# Patient Record
(demographics unavailable — no encounter records)

---

## 2024-10-09 NOTE — ASSESSMENT
[FreeTextEntry1] : 53 y/o F with newly diagnosed LEFT breast [id number], ER/ID pos, Her 2 pos, stage 1 breast cancer, planning to undergo bilateral skin-sparing mastectomy with SLNBx, possible ALND  s/p BL free msTRAP flap breast reconstruction, doing well with excellent result Left breast hematoma aspirated 4/9: 15cc, 4/16: negative completed left breast XRT 11/2021  BL reconstructed breast asymmetry s/p massive weight loss indicated for revision.  s/p BL reconstructed breast revision with direct excision. Doing well.   Now POD#7 s/p right breast reconstruction revision with direct excision (lift). Doing very well and happy with results.   - suture tails removed, steri strips applied  - may shower, no submerging - pathology reviewed, benign - Scarguard to left breast scar  - post-op instructions discussed and all her questions were answered  - f/u 1 month with Dr. Morataya

## 2024-10-09 NOTE — PHYSICAL EXAM
[de-identified] : well-appearing, NAD [de-identified] : good inspiratory effort [de-identified] : no gross truncal asymmetry [de-identified] : Bilateral PAULO flaps warm soft with good cap refill, mastectomy flaps well perfused; right breast incision healing well, c/d/i, significantly improved breast symmetry, no palpable masses. No axillary masses  [de-identified] : incision healed, mature scar, no keloids

## 2024-10-09 NOTE — DATA REVIEWED
[FreeTextEntry1] : [facility] Accession Number : [id number] Patient:     KYAW BRANDON   Accession:                             [id number]  Collected Date/Time:                   10/2/2024 10:20 EDT Received Date/Time:                    10/2/2024 11:20 EDT  Surgical Pathology Report - Auth (Verified)  Specimen(s) Submitted Right breast mastectomy flap  Final Diagnosis Breast, right tissue and skin, PAULO flap reconstruction: - Benign fibrofatty breast tissue, skin, and subcutaneous adipose tissue with focal remote postsurgical site changes including suture granulomata.  Verified by: Hari James M.D. (Electronic Signature) Reported on: 10/04/24 14:09 EDT, [place] [address] Phone: [phone]   Fax: [phone]

## 2024-10-09 NOTE — ASSESSMENT
[FreeTextEntry1] : 53 y/o F with newly diagnosed LEFT breast [id number], ER/DC pos, Her 2 pos, stage 1 breast cancer, planning to undergo bilateral skin-sparing mastectomy with SLNBx, possible ALND  s/p BL free msTRAP flap breast reconstruction, doing well with excellent result Left breast hematoma aspirated 4/9: 15cc, 4/16: negative completed left breast XRT 11/2021  BL reconstructed breast asymmetry s/p massive weight loss indicated for revision.  s/p BL reconstructed breast revision with direct excision. Doing well.   Now POD#7 s/p right breast reconstruction revision with direct excision (lift). Doing very well and happy with results.   - suture tails removed, steri strips applied  - may shower, no submerging - pathology reviewed, benign - Scarguard to left breast scar  - post-op instructions discussed and all her questions were answered  - f/u 1 month with Dr. Morataya

## 2024-10-09 NOTE — HISTORY OF PRESENT ILLNESS
[FreeTextEntry1] : Pt is a 49 y/o F, , postmenopausal, with PMH of SAHRA on CPAP, obesity s/p gastric sleeve  (lost 80 lbs), migraines (gets botox q12 weeks), asthma (no hospitalizations since childhood), gastritis, HLD, and anxiety who presents for breast reconstruction consult. Pt was recently diagnosed with LEFT breast IDC, [id number], ER/WA pos, Her 2 pos, stage 1 breast cancer. Pt self-palpated a lump in 2020. Denies nipple discharge or inversion.  Per consultation with Dr. Correa she is a great candidate for either breast conservation surgery + XRT or mastectomy, both would require SLNBx. Patient is leaning towards bilateral mastectomy. Pt states she does not want implants.  H/o right breast cyst aspiration. No prior breast biopsies or surgeries  Family h/o Breast CA: two maternal aunts dx at age 46 and 47   Ht 5'6" Wt 200lb; weight stable Current bra size: 38D-DD Pt would like to be "as small as possible" --B cup or small C cup. C/o back and neck pain as well as shoulder grooving Nonsmoker Denies h/o VTE or MRSA infections Occ:   Interval hx (21): Pt presents today for follow up. Had CTA chest and abd/pel for preoperative planning as recommended. Still interested in free flap reconstruction and again endorses that she does not want implants.  No new health issues.  Interval hx (21):  POD#9 s/p BL msTRAM free flap.  Ambulating.  Compliant with abd binder and surgical bra.  Denies fevers, chills, swelling, calf pain, SOB, CP.   Only requiring Tylenol and gabapentin and celebrex.  Has not require xanax since hospitalization.  RADHA drains LSL 20/10/minimal; LIMF trace/trace/trace); RSL 10/15/5; RIMF 15/10/15; LAbd 60/30/50/20; RAbd 70/30/50/20  Interval hx (21): POD #16 s/p BL msTRAM free flap. Feeling well. Ambulating and doing PT/OT. Taking only Tylenol. Denies f/c, CP/SOB, abdominal pain, or calf pain. Drain output: BL breasts <5cc each daily, right abdomen 10-15cc/day.  Interval hx (21): POD #23 s/p BL msTRAM free flap. Feeling well. C/o mild heavy sensation to BL breasts,L>R, but otherwise feeling well and happy with results.  Interval hx (5/10/21):  6 weeks s/p BL msTRAM free flap.  Feeling well and happy with results. Recently had portacath placed; due to start CTX 21.  Interval hx (21)  3 months s/p BL msTRAM free flap.  pt is very happy with results.  pt has 4 cycles of chemo left; unknown at this time if XRT needed.  Pt would like to have smaller breast volume if possible at future revision.  Interval hx (21):  9 months s/p BL msTRAM free flap. Completed chemotherapy 2021. Completed radiation 2021. On letrozole and Herceptin + Perjeta. Taking gabapentin and Vitamin B complex for neuropathy. Still interested in being smaller.  Interval hx (22):  here for annual follow s/p  BL msTRAM free flap. Completed chemotherapy 2021. Completed radiation 2021.  s/p ADAM/BSO.  DC from [facility] therapy.  Performed compression and wearing sleeve.  Actively losing weight.  Pt still desires BL breast recon revision with debulking; she feels the size is too large.  Interval hx (12/15/23)  Here for annual check.  s/p BL msTRAM free flap. Completed chemotherapy 2021. Completed left breast radiation 2021.  She has since lost weight intentionally (-80 lbs).  Now 142 lbs after Ozempic; she is now being weaned off.  Pt still feels her BL breast are too large.  Wearing bralettes.  Here to discuss breast debulking options.  Interval hx (24). Pt here POD#7 s/p revision BL breast reconstruction. Doing well with no significant pain, f/c or bleeding. Took all prescribed medications.   Interval hx (24). Pt here 5 weeks s/p revision BL breast reconstruction. She is happy with results. Endorses right breast has dropped more than left breast. Left breast is softer than before surgery.  Interval hx (24):  11.5 weeks s/p revision BL breast reconstruction.  She is here for confirmation if she would like to proceed with additional revisional procedures.   Interval hx (10/9/24). Pt presents today POD#7 s/p revision of right breast reconstruction. Doing very well and happy with results. Denies any pain, f/c or bleeding.

## 2024-10-09 NOTE — PHYSICAL EXAM
[de-identified] : well-appearing, NAD [de-identified] : good inspiratory effort [de-identified] : no gross truncal asymmetry [de-identified] : Bilateral PAULO flaps warm soft with good cap refill, mastectomy flaps well perfused; right breast incision healing well, c/d/i, significantly improved breast symmetry, no palpable masses. No axillary masses  [de-identified] : incision healed, mature scar, no keloids

## 2024-10-09 NOTE — HISTORY OF PRESENT ILLNESS
[FreeTextEntry1] : Pt is a 49 y/o F, , postmenopausal, with PMH of SAHRA on CPAP, obesity s/p gastric sleeve  (lost 80 lbs), migraines (gets botox q12 weeks), asthma (no hospitalizations since childhood), gastritis, HLD, and anxiety who presents for breast reconstruction consult. Pt was recently diagnosed with LEFT breast IDC, [id number], ER/NC pos, Her 2 pos, stage 1 breast cancer. Pt self-palpated a lump in 2020. Denies nipple discharge or inversion.  Per consultation with Dr. Correa she is a great candidate for either breast conservation surgery + XRT or mastectomy, both would require SLNBx. Patient is leaning towards bilateral mastectomy. Pt states she does not want implants.  H/o right breast cyst aspiration. No prior breast biopsies or surgeries  Family h/o Breast CA: two maternal aunts dx at age 46 and 47   Ht 5'6" Wt 200lb; weight stable Current bra size: 38D-DD Pt would like to be "as small as possible" --B cup or small C cup. C/o back and neck pain as well as shoulder grooving Nonsmoker Denies h/o VTE or MRSA infections Occ:   Interval hx (21): Pt presents today for follow up. Had CTA chest and abd/pel for preoperative planning as recommended. Still interested in free flap reconstruction and again endorses that she does not want implants.  No new health issues.  Interval hx (21):  POD#9 s/p BL msTRAM free flap.  Ambulating.  Compliant with abd binder and surgical bra.  Denies fevers, chills, swelling, calf pain, SOB, CP.   Only requiring Tylenol and gabapentin and celebrex.  Has not require xanax since hospitalization.  RADHA drains LSL 20/10/minimal; LIMF trace/trace/trace); RSL 10/15/5; RIMF 15/10/15; LAbd 60/30/50/20; RAbd 70/30/50/20  Interval hx (21): POD #16 s/p BL msTRAM free flap. Feeling well. Ambulating and doing PT/OT. Taking only Tylenol. Denies f/c, CP/SOB, abdominal pain, or calf pain. Drain output: BL breasts <5cc each daily, right abdomen 10-15cc/day.  Interval hx (21): POD #23 s/p BL msTRAM free flap. Feeling well. C/o mild heavy sensation to BL breasts,L>R, but otherwise feeling well and happy with results.  Interval hx (5/10/21):  6 weeks s/p BL msTRAM free flap.  Feeling well and happy with results. Recently had portacath placed; due to start CTX 21.  Interval hx (21)  3 months s/p BL msTRAM free flap.  pt is very happy with results.  pt has 4 cycles of chemo left; unknown at this time if XRT needed.  Pt would like to have smaller breast volume if possible at future revision.  Interval hx (21):  9 months s/p BL msTRAM free flap. Completed chemotherapy 2021. Completed radiation 2021. On letrozole and Herceptin + Perjeta. Taking gabapentin and Vitamin B complex for neuropathy. Still interested in being smaller.  Interval hx (22):  here for annual follow s/p  BL msTRAM free flap. Completed chemotherapy 2021. Completed radiation 2021.  s/p ADAM/BSO.  DC from [facility] [employer] [place] therapy.  Performed compression and wearing sleeve.  Actively losing weight.  Pt still desires BL breast recon revision with debulking; she feels the size is too large.  Interval hx (12/15/23)  Here for annual check.  s/p BL msTRAM free flap. Completed chemotherapy 2021. Completed left breast radiation 2021.  She has since lost weight intentionally (-80 lbs).  Now 142 lbs after Ozempic; she is now being weaned off.  Pt still feels her BL breast are too large.  Wearing bralettes.  Here to discuss breast debulking options.  Interval hx (24). Pt here POD#7 s/p revision BL breast reconstruction. Doing well with no significant pain, f/c or bleeding. Took all prescribed medications.   Interval hx (24). Pt here 5 weeks s/p revision BL breast reconstruction. She is happy with results. Endorses right breast has dropped more than left breast. Left breast is softer than before surgery.  Interval hx (24):  11.5 weeks s/p revision BL breast reconstruction.  She is here for confirmation if she would like to proceed with additional revisional procedures.   Interval hx (10/9/24). Pt presents today POD#7 s/p revision of right breast reconstruction. Doing very well and happy with results. Denies any pain, f/c or bleeding.

## 2024-10-09 NOTE — DATA REVIEWED
[FreeTextEntry1] : [facility] Accession Number : [id number] Patient:     KYAW BRANDON   Accession:                             [id number]  Collected Date/Time:                   10/2/2024 10:20 EDT Received Date/Time:                    10/2/2024 11:20 EDT  Surgical Pathology Report - Auth (Verified)  Specimen(s) Submitted Right breast mastectomy flap  Final Diagnosis Breast, right tissue and skin, PAULO flap reconstruction: - Benign fibrofatty breast tissue, skin, and subcutaneous adipose tissue with focal remote postsurgical site changes including suture granulomata.  Verified by: Hari James M.D. (Electronic Signature) Reported on: 10/04/24 14:09 EDT, [place] [address] Phone: [phone]   Fax: [phone]

## 2024-11-08 NOTE — HISTORY OF PRESENT ILLNESS
[FreeTextEntry1] : Pt is a 49 y/o F, , postmenopausal, with PMH of SAHRA on CPAP, obesity s/p gastric sleeve  (lost 80 lbs), migraines (gets botox q12 weeks), asthma (no hospitalizations since childhood), gastritis, HLD, and anxiety who presents for breast reconstruction consult. Pt was recently diagnosed with LEFT breast IDC, [id number], ER/[place] pos, Her 2 pos, stage 1 breast cancer. Pt self-palpated a lump in 2020. Denies nipple discharge or inversion.  Per consultation with Dr. Correa she is a great candidate for either breast conservation surgery + XRT or mastectomy, both would require SLNBx. Patient is leaning towards bilateral mastectomy. Pt states she does not want implants.  H/o right breast cyst aspiration. No prior breast biopsies or surgeries  Family h/o Breast CA: two maternal aunts dx at age 46 and 47   Ht 5'6" Wt 200lb; weight stable Current bra size: 38D-DD Pt would like to be "as small as possible" --B cup or small C cup. C/o back and neck pain as well as shoulder grooving Nonsmoker Denies h/o VTE or MRSA infections Occ:   Interval hx (21): Pt presents today for follow up. Had CTA chest and abd/pel for preoperative planning as recommended. Still interested in free flap reconstruction and again endorses that she does not want implants.  No new health issues.  Interval hx (21):  POD#9 s/p BL msTRAM free flap.  Ambulating.  Compliant with abd binder and surgical bra.  Denies fevers, chills, swelling, calf pain, SOB, CP.   Only requiring Tylenol and gabapentin and celebrex.  Has not require xanax since hospitalization.  RADHA drains LSL 20/10/minimal; LIMF trace/trace/trace); RSL 10/15/5; RIMF 15/10/15; LAbd 60/30/50/20; RAbd 70/30/50/20  Interval hx (21): POD #16 s/p BL msTRAM free flap. Feeling well. Ambulating and doing PT/OT. Taking only Tylenol. Denies f/c, CP/SOB, abdominal pain, or calf pain. Drain output: BL breasts <5cc each daily, right abdomen 10-15cc/day.  Interval hx (21): POD #23 s/p BL msTRAM free flap. Feeling well. C/o mild heavy sensation to BL breasts,L>R, but otherwise feeling well and happy with results.  Interval hx (5/10/21):  6 weeks s/p BL msTRAM free flap.  Feeling well and happy with results. Recently had portacath placed; due to start CTX 21.  Interval hx (21)  3 months s/p BL msTRAM free flap.  pt is very happy with results.  pt has 4 cycles of chemo left; unknown at this time if XRT needed.  Pt would like to have smaller breast volume if possible at future revision.  Interval hx (21):  9 months s/p BL msTRAM free flap. Completed chemotherapy 2021. Completed radiation 2021. On letrozole and Herceptin + Perjeta. Taking gabapentin and Vitamin B complex for neuropathy. Still interested in being smaller.  Interval hx (22):  here for annual follow s/p  BL msTRAM free flap. Completed chemotherapy 2021. Completed radiation 2021.  s/p ADAM/BSO.  DC from [facility] [employer] therapy.  Performed compression and wearing sleeve.  Actively losing weight.  Pt still desires BL breast recon revision with debulking; she feels the size is too large.  Interval hx (12/15/23)  Here for annual check.  s/p BL msTRAM free flap. Completed chemotherapy 2021. Completed left breast radiation 2021.  She has since lost weight intentionally (-80 lbs).  Now 142 lbs after Ozempic; she is now being weaned off.  Pt still feels her BL breast are too large.  Wearing bralettes.  Here to discuss breast debulking options.  Interval hx (24). Pt here POD#7 s/p revision BL breast reconstruction. Doing well with no significant pain, f/c or bleeding. Took all prescribed medications.   Interval hx (24). Pt here 5 weeks s/p revision BL breast reconstruction. She is happy with results. Endorses right breast has dropped more than left breast. Left breast is softer than before surgery.  Interval hx (24):  11.5 weeks s/p revision BL breast reconstruction.  She is here for confirmation if she would like to proceed with additional revisional procedures.   Interval hx (10/9/24). Pt presents today POD#7 s/p revision of right breast reconstruction. Doing very well and happy with results. Denies any pain, f/c or bleeding.   Interval hx (24):  5 weeks s/p revision of right breast reconstruction. Doing very well and happy with results.  She feels better in her clothes

## 2024-11-08 NOTE — PHYSICAL EXAM
[de-identified] : well-appearing, NAD [de-identified] : good inspiratory effort [de-identified] : no gross truncal asymmetry [de-identified] : Bilateral PAULO flaps warm soft with good cap refill, mastectomy flaps well perfused; right and left breast incision healing well, significantly improved breast symmetry, no palpable masses. No axillary masses  [de-identified] : incision healed, mature scar, no keloids

## 2024-11-08 NOTE — DATA REVIEWED
[FreeTextEntry1] : T[facility] Accession Number : [id number] Patient:     KYAW BRANDON   Accession:                             [id number]  Collected Date/Time:                   10/2/2024 10:20 EDT Received Date/Time:                    10/2/2024 11:20 EDT  Surgical Pathology Report - Auth (Verified)  Specimen(s) Submitted Right breast mastectomy flap  Final Diagnosis Breast, right tissue and skin, PAULO flap reconstruction: - Benign fibrofatty breast tissue, skin, and subcutaneous adipose tissue with focal remote postsurgical site changes including suture granulomata.  Verified by: Hari James M.D. (Electronic Signature) Reported on: 10/04/24 14:09 EDT, [place] [address] Phone: [phone]   Fax: [phone]

## 2024-11-08 NOTE — ASSESSMENT
[FreeTextEntry1] : 53 y/o F with newly diagnosed LEFT breast [id number], ER/MI pos, Her 2 pos, stage 1 breast cancer, planning to undergo bilateral skin-sparing mastectomy with SLNBx, possible ALND  s/p BL free msTRAP flap breast reconstruction, doing well with excellent result Left breast hematoma aspirated 4/9: 15cc, 4/16: negative completed left breast XRT 11/2021  BL reconstructed breast asymmetry s/p massive weight loss indicated for revision.  s/p BL reconstructed breast revision with direct excision. Doing well.   5 weeks s/p right breast reconstruction revision with direct excision (lift). Doing very well and happy with results.   - pathology reviewed, benign - silicone tape to left breast scar  - no physical activity restrictions - all her questions were answered  - f/u 5 months, post-op photos

## 2024-12-30 NOTE — PHYSICAL EXAM
[Chaperone Present] : A chaperone was present in the examining room during all aspects of the physical examination [FreeTextEntry2] : DASH [Appropriately responsive] : appropriately responsive [Alert] : alert [No Acute Distress] : no acute distress [No Lymphadenopathy] : no lymphadenopathy [Regular Rate Rhythm] : regular rate rhythm [No Murmurs] : no murmurs [Clear to Auscultation B/L] : clear to auscultation bilaterally [Soft] : soft [Non-tender] : non-tender [Non-distended] : non-distended [No HSM] : No HSM [No Lesions] : no lesions [No Mass] : no mass [Oriented x3] : oriented x3 [Right Breast Absent] : a total mastectomy [Breast Reconstruction Right] : breast reconstruction [Left Breast Absent] : a total mastectomy [Breast Reconstruction Left] : breast reconstruction [Labia Majora] : normal [Labia Minora] : normal [Normal] : normal [Atrophy] : atrophy [Absent] : absent [Uterine Adnexae] : absent

## 2024-12-30 NOTE — DISCUSSION/SUMMARY
Patient notified of results, [facility] as patient [facility]. Please fax and provide patient information so patient can be scheduled.   [FreeTextEntry1] : Pap done Follow-up with breast specialist and medical oncologist as scheduled Follow-up yearly or as needed

## 2025-01-21 NOTE — HISTORY OF PRESENT ILLNESS
[FreeTextEntry1] : Lilly is a 54 F with a L breast IDC (+/+/-) s/p b/l skin sparing mastectomy, right sentinel lymph node biopsy, left axillary lymph node dissection with bilateral msTRAM flap reconstruction on 3/24/2021: Left 3cm IDC, 1/21 nodes; R benign 0/1 node. [id number]  S/p adjuvant TCHP. s/p L PMRT. On letrozole.  S/P prophylactic BSO and hysterectomy 3/2022.  She underwent bilateral revision of breast reconstruction in 2024.  HISTORICAL RISK FACTORS:  -family history of breast cancer in two maternal aunts at ages 46 and 47  -, age at first live birth was 20  -prior OCP use x 3 years, stopped in  -no gyn surgeries   She had the following work up:  12/10/2020 -- b/l dx mammogram and US  -scattered areas of fibroglandular densities -no suspicious mass, microcalcifications or architectural distortion in R  -LEFT: @6:00, retroareolar, correlating with patient's palpable concerns, 1.5 cm spiculated mass  b/l US  -RIGHT: no suspicious abnormalities  -LEFT: @6:00, retroareolar, hypoechoic irregular mass, measuring 1 x 0.7 x 0.8 cm --> BIOPSY;  @5:00, retroareolar, hypoechoic irregular mass, measuring 8 x 8 x 7 mm --> BIOPSY  -no suspicious lymphadenopathy  BIRADS 5   2021 -- L US CNBx x 2  1. @5:00, retroareolar (hourglass) -IDC, moderately to poorly differentiated  -focal DCIS, intermediate to high nuclear grade  -ER/CO pos (Alyse 8/4), Her 2 pos on IHC, Ki67:30%  2. @6:00, retroareolar (cork clip)  -IDC, moderately to poorly differentiated  -focal DCIS, intermediate to high nuclear grade  -ER/CO pos (Alyse 8/4), Her 2 pos on IHC, Ki67:30%  3/24/2021 -- b/l SSM and SLN Bx, L ALND with immediate PAULO flap reconstruction  1. L breast -IDC, poorly differentiated -T= 3 cm  -DCIS, high nuclear grade  -no LVI  -1/2 SLN positive for metastatic disease  -19 benign left axillary lymph nodes   2. R breast  -benign fibrofatty breast tissue  -1 negative sentinel lymph node   S/p adjuvant TCHP. s/p L PMRT. On letrozole. S/P prophylactic BSO and hysterectomy 3/2022.  23: R US --> BIRADS 4 Targeted unilateral right chest wall ultrasound was performed. At 1:00 N3 area of palpable concern in the right chest wall there is a round heterogeneous mass measuring 0.6 x 0.8 x 0.4 cm. Ultrasound-guided biopsy recommended.  2023: USGB, R Chest wall, right mass, ultrasound-guided needle core biopsies:(top-hat) - Benign fibroadipose connective tissue with microscopic foci of remote fat necrosis. - Unremarkable fragments of hyaline cartilage. (concordant - as per verbal report by radiology)  2024 right US -->[id number]  9:00 position 8 to 9 cm from the scar, corresponding to the area palpable concern, there is a circumscribed mass measuring 0.6 x 0.7 x 0.3 cm. Ultrasound-guided biopsy biopsied mass at the 1:00 position 3 cm from the scar is stable and measures 0.7 x 0.6 x 0.3 cm  2024 BREAST, RIGHT MASS, ULTRASOUND-GUIDED NEEDLE CORE BIOPSIES:(stop-light) - BENIGN DERMAL SOFT TISSUE FRAGMENT WITH REMOTE POSTPROCEDURAL SITE CHANGES INCLUDING CHRONIC LYMPHOHISTIOCYTIC CELL INFLAMMATION, A MULTINUCLEATED FOREIGN BODY TYPE GIANT CELL REACTION, AND REMOTE HEMORRHAGE. Findings are benign concordant.  She underwent bilateral revision of breast reconstruction in 2024: 1.  Breast, left mastectomy flap, excision/reconstruction: - Benign skin with dermal fibrosis and focal remote postprocedural site changes. 2.  Breast, right mastectomy flap, excision/reconstruction: - Benign skin with dermal fibrosis and focal remote postprocedural site changes.  10/2/24: right breast reconstruction revision with direct excision (lift) Breast, right tissue and skin, PAULO flap reconstruction: - Benign fibrofatty breast tissue, skin, and subcutaneous adipose tissue with focal remote postsurgical site changes including suture granulomata.  She notes an area of concern in the left axilla. States it is tender. No associated symptoms.

## 2025-01-21 NOTE — ASSESSMENT
[FreeTextEntry1] : Patient is a 54F with history of left IDC (+/+/+) s/p bilateral SSM/SLNB and left ALND with bilateral msTRAM flap reconstruction in 3/2021: Left 3cm IDC, 1/21 nodes. pT2N1. She had adjuvant TCHP and left PMRT. On letrozole.  She s/p bilateral revision of breast reconstruction in 6/2024.  She underwent right US in 7/2023 which showed a 1N3 8mm nodule biopsied as benign fibroadipose tissue (tophat) (concordant was per verbal report from radiology).  She had right US in 4/2024 which showed stable [id number] and 9N6 0.7cm mass biopsied as benign dermal soft tissue (stoplight, concordant).  She is doing well from her revision surgeries. The pathology from her revisions was discussed - benign. She notes an area of concern in the left axilla. We discussed obtaining an Us to r/o any underlying causes.  She last saw medical oncology in 9/2024 with a 6 month follow up recommended. She saw rad onc in 2/2023 with an annual follow up recommended and saw plastic surgery in 11/2024 with 5 month follow up recommended.  We discussed the importance of following of for clinical exams.  Left bio-impedance testing performed.  All questions and concerns were answered in detail.  Patient is for left breast Us now.  If US is benign, she will be for follow up in 6 months for CBE.  She is to follow up with med onc and rad onc as scheduled.  She is to follow up with plastics as scheduled.  For OT as indicated.  Left sozo performed.  Total time spent on encounter was greater than 30 minutes, which included face to face time with the patient, performing an exam, reviewing previous medical records including imaging/ pathology, documenting in patient record and coordinating care/imaging. Greater than 50% of the encounter was spent on counseling and coordination of her breast issue.

## 2025-01-21 NOTE — PHYSICAL EXAM
[Normocephalic] : normocephalic [EOMI] : extra ocular movement intact [No Supraclavicular Adenopathy] : no supraclavicular adenopathy [No Cervical Adenopathy] : no cervical adenopathy [Examined in the supine and seated position] : examined in the supine and seated position [No dominant masses] : no dominant masses left breast [No Axillary Lymphadenopathy] : no left axillary lymphadenopathy [No Rashes] : no rashes [No Ulceration] : no ulceration [No dominant masses] : no dominant masses in right breast  [de-identified] : Nl respirations [de-identified] : s/p bilateral mastectomies [de-identified] : s/p bilateral mastectomies

## 2025-01-21 NOTE — PAST MEDICAL HISTORY
[Menarche Age ____] : age at menarche was [unfilled] [Menopause Age____] : age at menopause was [unfilled] [Total Preg ___] : G[unfilled] [Live Births ___] : P[unfilled]  [Age At Live Birth ___] : Age at live birth: [unfilled] [History of Hormone Replacement Treatment] : has no history of hormone replacement treatment [FreeTextEntry5] :   [FreeTextEntry6] : denies [FreeTextEntry7] : yes in past x 3 years, stopped in 2002  [FreeTextEntry8] : yes x 6 months

## 2025-01-21 NOTE — ASSESSMENT
[FreeTextEntry1] : Patient is a 54F with history of left IDC (+/+/+) s/p bilateral SSM/SLNB and left ALND with bilateral msTRAM flap reconstruction in 3/2021: Left 3cm IDC, 1/21 nodes. pT2N1. She had adjuvant TCHP and left PMRT. On letrozole.  She s/p bilateral revision of breast reconstruction in 6/2024.  She underwent right US in 7/2023 which showed a 1N3 8mm nodule biopsied as benign fibroadipose tissue (tophat) (concordant was per verbal report from radiology).  She had right US in 4/2024 which showed stable [id number] and 9N6 0.7cm mass biopsied as benign dermal soft tissue (stoplight, concordant).  She is doing well from her revision surgeries. The pathology from her revisions was discussed - benign. She notes an area of concern in the left axilla. We discussed obtaining an Us to r/o any underlying causes.  She last saw medical oncology in 9/2024 with a 6 month follow up recommended. She saw rad onc in 2/2023 with an annual follow up recommended and saw plastic surgery in 11/2024 with 5 month follow up recommended.  We discussed the importance of following of for clinical exams.  Left bio-impedance testing performed.  All questions and concerns were answered in detail.  Patient is for left breast Us now.  If US is benign, she will be for follow up in 6 months for CBE.  She is to follow up with med onc and rad onc as scheduled.  She is to follow up with plastics as scheduled.  For OT as indicated.  Left sozo performed.  Total time spent on encounter was greater than 30 minutes, which included face to face time with the patient, performing an exam, reviewing previous medical records including imaging/ pathology, documenting in patient record and coordinating care/imaging. Greater than 50% of the encounter was spent on counseling and coordination of her breast issue.

## 2025-01-21 NOTE — HISTORY OF PRESENT ILLNESS
[FreeTextEntry1] : Lilly is a 54 F with a L breast IDC (+/+/-) s/p b/l skin sparing mastectomy, right sentinel lymph node biopsy, left axillary lymph node dissection with bilateral msTRAM flap reconstruction on 3/24/2021: Left 3cm IDC, 1/21 nodes; R benign 0/1 node. [id number]  S/p adjuvant TCHP. s/p L PMRT. On letrozole.  S/P prophylactic BSO and hysterectomy 3/2022.  She underwent bilateral revision of breast reconstruction in 2024.  HISTORICAL RISK FACTORS:  -family history of breast cancer in two maternal aunts at ages 46 and 47  -, age at first live birth was 20  -prior OCP use x 3 years, stopped in  -no gyn surgeries   She had the following work up:  12/10/2020 -- b/l dx mammogram and US  -scattered areas of fibroglandular densities -no suspicious mass, microcalcifications or architectural distortion in R  -LEFT: @6:00, retroareolar, correlating with patient's palpable concerns, 1.5 cm spiculated mass  b/l US  -RIGHT: no suspicious abnormalities  -LEFT: @6:00, retroareolar, hypoechoic irregular mass, measuring 1 x 0.7 x 0.8 cm --> BIOPSY;  @5:00, retroareolar, hypoechoic irregular mass, measuring 8 x 8 x 7 mm --> BIOPSY  -no suspicious lymphadenopathy  BIRADS 5   2021 -- L US CNBx x 2  1. @5:00, retroareolar (hourglass) -IDC, moderately to poorly differentiated  -focal DCIS, intermediate to high nuclear grade  -ER/AL pos (Alyse 8/4), Her 2 pos on IHC, Ki67:30%  2. @6:00, retroareolar (cork clip)  -IDC, moderately to poorly differentiated  -focal DCIS, intermediate to high nuclear grade  -ER/AL pos (Alyse 8/4), Her 2 pos on IHC, Ki67:30%  3/24/2021 -- b/l SSM and SLN Bx, L ALND with immediate PAULO flap reconstruction  1. L breast -IDC, poorly differentiated -T= 3 cm  -DCIS, high nuclear grade  -no LVI  -1/2 SLN positive for metastatic disease  -19 benign left axillary lymph nodes   2. R breast  -benign fibrofatty breast tissue  -1 negative sentinel lymph node   S/p adjuvant TCHP. s/p L PMRT. On letrozole. S/P prophylactic BSO and hysterectomy 3/2022.  23: R US --> BIRADS 4 Targeted unilateral right chest wall ultrasound was performed. At 1:00 N3 area of palpable concern in the right chest wall there is a round heterogeneous mass measuring 0.6 x 0.8 x 0.4 cm. Ultrasound-guided biopsy recommended.  2023: USGB, R Chest wall, right mass, ultrasound-guided needle core biopsies:(top-hat) - Benign fibroadipose connective tissue with microscopic foci of remote fat necrosis. - Unremarkable fragments of hyaline cartilage. (concordant - as per verbal report by radiology)  2024 right US -->[id number]  9:00 position 8 to 9 cm from the scar, corresponding to the area palpable concern, there is a circumscribed mass measuring 0.6 x 0.7 x 0.3 cm. Ultrasound-guided biopsy biopsied mass at the 1:00 position 3 cm from the scar is stable and measures 0.7 x 0.6 x 0.3 cm  2024 BREAST, RIGHT MASS, ULTRASOUND-GUIDED NEEDLE CORE BIOPSIES:(stop-light) - BENIGN DERMAL SOFT TISSUE FRAGMENT WITH REMOTE POSTPROCEDURAL SITE CHANGES INCLUDING CHRONIC LYMPHOHISTIOCYTIC CELL INFLAMMATION, A MULTINUCLEATED FOREIGN BODY TYPE GIANT CELL REACTION, AND REMOTE HEMORRHAGE. Findings are benign concordant.  She underwent bilateral revision of breast reconstruction in 2024: 1.  Breast, left mastectomy flap, excision/reconstruction: - Benign skin with dermal fibrosis and focal remote postprocedural site changes. 2.  Breast, right mastectomy flap, excision/reconstruction: - Benign skin with dermal fibrosis and focal remote postprocedural site changes.  10/2/24: right breast reconstruction revision with direct excision (lift) Breast, right tissue and skin, PAULO flap reconstruction: - Benign fibrofatty breast tissue, skin, and subcutaneous adipose tissue with focal remote postsurgical site changes including suture granulomata.  She notes an area of concern in the left axilla. States it is tender. No associated symptoms.

## 2025-01-21 NOTE — PHYSICAL EXAM
[Normocephalic] : normocephalic [EOMI] : extra ocular movement intact [No Supraclavicular Adenopathy] : no supraclavicular adenopathy [No Cervical Adenopathy] : no cervical adenopathy [Examined in the supine and seated position] : examined in the supine and seated position [No dominant masses] : no dominant masses left breast [No Axillary Lymphadenopathy] : no left axillary lymphadenopathy [No Rashes] : no rashes [No Ulceration] : no ulceration [No dominant masses] : no dominant masses in right breast  [de-identified] : Nl respirations [de-identified] : s/p bilateral mastectomies [de-identified] : s/p bilateral mastectomies

## 2025-03-21 NOTE — REVIEW OF SYSTEMS
[Negative] : Allergic/Immunologic [FreeTextEntry7] : GERD/ Gastric irritation. Dull ache intermittent sharp pain to LUQ/Chest wall x 2 months

## 2025-03-21 NOTE — CONSULT LETTER
[Dear  ___] : Dear  [unfilled], [Courtesy Letter:] : I had the pleasure of seeing your patient, [unfilled], in my office today. [Consult Closing:] : Thank you very much for allowing me to participate in the care of this patient.  If you have any questions, please do not hesitate to contact me. [DrShun  ___] : Dr. KRAUS [DrShun ___] : Dr. KRAUS [FreeTextEntry3] : Celeste Early MD

## 2025-03-21 NOTE — ASSESSMENT
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 S/P prophylactic BSO and hysterectomy.  Assessment and Plan: -- Continue Letrozole daily. AI related side effects reviewed.   -- S/P b/l mastectomy and reconstruction. Breast exam did not reveal palapble abnormality.  -- Osteopenia. Continue calcium and vitamin D supplement. Encourage regular exercise.  -- Bone pain in the Hip and back. Bone scan was negative for metastatic bone disease.   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  -- Insomnia. She takes Ambien 10 mg hs prn. -- S/P PMRT. Followup with Dr. Todd as scheduled. -- CBC , BMP ,LFT CEA , CA15.3 today  -- RTO for followup in 6 months. Advised to call if she has any new concern.

## 2025-03-21 NOTE — PHYSICAL EXAM
[Fully active, able to carry on all pre-disease performance without restriction] : Status 0 - Fully active, able to carry on all pre-disease performance without restriction [Normal] : affect appropriate [Mucositis] : no mucositis [Thrush] : no thrush [de-identified] : Alopecia+, wearing a hat [de-identified] : Port area- looks clean.  [de-identified] : Status post b/l mastectomy and TRAM deep flap reconstruction. Surgical incisions are healing well. Skin changes from radiation therapy [de-identified] : Incisions healing well. [de-identified] : RIght lower back and hip tenderness with selective guarding with ambulation.

## 2025-03-21 NOTE — HISTORY OF PRESENT ILLNESS
[de-identified] : 51 yo female is referred by Dr. Correa for consultation adjuvant systemic therapy for breast cancer. Lilly first palpated a mass in her left retroareolar breast in 2020. Diagnostic b/l mammo on 2020 showed 1.5 cm spiculated mass in the left breast  @6:00, retroareolar, correlating with patient's palpable concerns. The left breast US showed 1 cm  hypoechoic mass at 6:00 retroareolar region and 8 mm hypoechoic mass at 5:00 retroareolar region. There was no suspicious axillary adenopathy.  \par  \par  On 21, she had US guided core biopsies x 2. \par  1. @5:00, retroareolar (hourglass)\par  -IDC, moderately to poorly differentiated \par  -focal DCIS, intermediate to high nuclear grade \par  -ER pos %, RI pos 71-80%, Her 2 pos (3+ by IHC), Ki67:30%\par  \par  2. @6:00, retroareolar (cork clip) \par  -IDC, moderately to poorly differentiated \par  -focal DCIS, intermediate to high nuclear grade \par  -ER pos %, RI pos 21-30% (Alyse /), Her 2 pos (3+ by IHC), Ki67:30%\par   \par  She saw Dr. Correa for surgical consultation and elected to have b/l mastectomy. She had CT angio chest, abdomen and pelvis on 21 for breast reconstruction. These imaging studies did not reveal evidence of distant metastasis.\par  \par  She has a family h/o of breast cancer and colon cancer.  She had germline genetic panel study and she is negative for mutations. \par  \par  On  3/24/21, she underwent left breast skin sparing modified radical mastectomy. The pathology revealed 3.0 cm invasive poorly differentiated ductal carcinoma in retroareolar/upper inner quadrant, Non-extensive ductal carcinoma in-situ (DCIS), solid type, high nuclear grade with pagetoid spread to involve the major lactiferous ducts/sinuses.  However, no involvement of the overlying epidermis is seen. No lymphovascular invasion is identified. Unremarkable nipple, skin, and deep fascial margin.  Negative for carcinoma. Two sentinel lymph nodes were removed. Metastatic carcinoma virtually replacing one lymph node (), 1.9 cm (macroscopic measurement). No definitive foci of extracapsular extension are seen. Left ALND removed Nineteen axillary lymph nodes and they are all negative for malignancy (0/19).  AJCC 8th Edition Pathologic Stage:  pT2, pN1a, pMx.\par  Right skin sparing simple mastectomy showed benign fibrofatty breast tissue with proliferative type fibrocystic changes. Nipple, skin, and deep fascial margin without histopathologic abnormality. One right axillary SLN is negative for malignancy. \par  \par  She had s/p BL msTRAM free flap reconstruction by Dr. Morataya. She recovered well from surgery. She is here today with her  to discuss adjuvant systemic therapy. \par  \par  She has PMH of SAHRA on CPAP, obesity s/p gastric sleeve  (lost 80 lbs), migraines (gets botox q12 weeks), asthma (no hospitalizations since childhood), gastritis, HLD,\par  She is  and has been menopause since 2016. \par   [de-identified] : 5/21/21- Patient is here for follow up. She has stage IIB ([id number]) IDC of the left breast, G3, triple positive, s/p left skin sparing modified radical mastectomy, SLNB and ALND, right skin sparing simple mastectomy and SLNB followed by b/l msTRAM free flap reconstruction. She had CT c/a/p prior to surgery which did not show evidence of distant metastasis. She has recovered well from surgery. She is here today to start chemotherapy.  Her Echo in Jan 2021 showed EF 65%. She had port catheter placement.   6/11/21-  Patient is here for follow up and chemotherapy.  She has stage IIB ([id number]) IDC of the left breast, G3, triple positive, s/p left skin sparing modified radical mastectomy, SLNB and ALND, right skin sparing simple mastectomy and SLNB followed by b/l msTRAM free flap reconstruction. She had CT c/a/p prior to surgery which did not show evidence of distant metastasis. She is on adjuvant chemotherapy with TCHP regimen. She is due for cycle 2 chemo today. She tolerated cycle 2 better than cycle 1.  Pt c/o severe insomnia.  She has h/o insomnia but melatonin not helping.  pretty well. She had some nausea that was better with medications. She had c/o severe back pain 2 days after the Neulasta shot that resolved in 1-2 days. She also had mucositis that is better with hurricane solution Also had c/o gastritis. .Her Echo in Jan 2021 showed EF 65%. She is able to eat well and her weight is stable. No fever or chills.   7/1/21: The patient is here today for f/u and chemotherapy. She has stage IIB ([id number]) IDC of the left breast, G3, triple positive, s/p left skin sparing modified radical mastectomy, SLNB and ALND, right skin sparing simple mastectomy and SLNB followed by b/l msTRAM free flap reconstruction. She is on adjuvant chemotherapy with TCHP regimen. To date, she has received 2 cycles. She tolerated cycle 2 better than cycle 1. She complains insomnia. She had some nausea that was better with medications. She had c/o severe back pain 2 days after the Neulasta shot that resolved in 1-2 days. She had mucositis and sore throat which has resolved.  She has a small opening along the incision of her left reconstructed breast. She saw Dr. Morataya and it has healed.    7/22/21 The patient is here today for f/u and chemotherapy. She has stage IIB ([id number]) IDC of the left breast, G3, triple positive, s/p left skin sparing modified radical mastectomy, SLNB and ALND, right skin sparing simple mastectomy and SLNB followed by b/l msTRAM free flap reconstruction. She is on adjuvant chemotherapy with TCHP regimen. To date, she has received 3 cycles. She complains abdominal retention, nausea, heartburn, decreased taste, diarrhea but controlled with Imodium.  Mucositis and sore throat have improved. New symptoms: mild eye tearing and slight paresthesias.  8/12/21: The patient is here today for f/u and chemotherapy. She has stage IIB ([id number]) IDC of the left breast, G3, triple positive, s/p left skin sparing modified radical mastectomy, SLNB and ALND, right skin sparing simple mastectomy and SLNB followed by b/l msTRAM free flap reconstruction. She is on adjuvant chemotherapy with TCHP regimen. To date, she has received 4 cycles. She complains nausea, diarrhea, feeling bloating, and arthralgia after previous cycle of chemo. She took Imodium for diarrhea.   9/3/21: The patient is here today for f/u and chemotherapy. She has stage IIB ([id number]) IDC of the left breast, G3, triple positive, s/p left skin sparing modified radical mastectomy, SLNB and ALND, right skin sparing simple mastectomy and SLNB followed by b/l msTRAM free flap reconstruction. She is on adjuvant chemotherapy with TCHP regimen. To date, she has received 5 cycles. Foe her previous cycle chemo, Emend was given to relieve n/v. She felt better. She was taking Imodium for diarrhea.   9/23/21: The patient is here today for f/u and treatment. She has stage IIB ([id number]) IDC of the left breast, G3, triple positive, s/p left skin sparing modified radical mastectomy, SLNB and ALND, right skin sparing simple mastectomy and SLNB followed by b/l msTRAM free flap reconstruction. She completed adjuvant chemotherapy with TCHP x 6. She saw Dr. Quinn and will start PMRT. She started adjuvant endocrine therapy with letrozole for a couple of weeks. She reports diarrhea 3-4 time per day.  She developed neuropathy and pain and numbness/tingling of her hands and feet.  10/13/21: The patient is here today for f/u and treatment. She has stage IIB ([id number]) IDC of the left breast, G3, triple positive, s/p left skin sparing modified radical mastectomy, SLNB and ALND, right skin sparing simple mastectomy and SLNB followed by b/l msTRAM free flap reconstruction. She completed adjuvant chemotherapy with TCHP x 6. She saw Dr. Quinn and started PMRT. She is taking adjuvant endocrine therapy with letrozole and tolerated well. Her bone density from 10/4/21 was normal. She complains symptoms of neuropathy with numbness in her hands and feet. She is taking Gabapentin, Vitamin B1 and B6.   11/24/21 Melodie is here today for f/u and treatment. She has stage IIB ([id number]) IDC of the left breast, G3, triple positive, s/p left skin sparing modified radical mastectomy, SLNB and ALND, right skin sparing simple mastectomy and SLNB followed by b/l msTRAM free flap reconstruction. She completed adjuvant chemotherapy with TCHP x 6. She continues Herceptin and Perjeta every 3 weeks. She completed PMRT on 11/16/21. She is taking adjuvant endocrine therapy with letrozole and tolerated well. She complains symptoms of neuropathy with intermittent numbness in her hands and feet. She is taking Gabapentin, Vitamin B1 and B6. She had bone density done 10/13 which was normal. Echocardiogram done 11/9/21 EF 64%.   12/23/21 Melodie is here today for f/u and treatment. She has stage IIB ([id number]) IDC of the left breast, G3, triple positive, s/p left skin sparing modified radical mastectomy, SLNB and ALND, right skin sparing simple mastectomy and SLNB followed by b/l msTRAM free flap reconstruction. She completed adjuvant chemotherapy with TCHP x 6. She continues Herceptin and Perjeta every 3 weeks. She completed PMRT on 11/16/21. She is taking adjuvant endocrine therapy with letrozole and tolerated well. She complains symptoms of neuropathy with intermittent numbness in her hands and feet, improving. She is taking Gabapentin, Vitamin B1 and B6. She had bone density done 10/13 which was normal. Echocardiogram done 11/9/21 EF 64%. She offers no new complaints.   1/13/22 Melodie is here today for f/u and treatment. She has stage IIB ([id number]) IDC of the left breast, G3, triple positive, s/p left skin sparing modified radical mastectomy, SLNB and ALND, right skin sparing simple mastectomy and SLNB followed by b/l msTRAM free flap reconstruction. She completed adjuvant chemotherapy with TCHP x 6. She continues Herceptin and Perjeta every 3 weeks. She completed PMRT on 11/16/21. She is taking adjuvant endocrine therapy with letrozole and tolerated well. She complains symptoms of neuropathy with intermittent numbness in her hands and feet, improving. She is taking Gabapentin, Vitamin B1 and B6. She saw GYN for annual follow up. She reports left upper quadrant pain under breast x 2 months. Pain is reproducible, it is described as a dull aching, occasional sharp pain. Nothing alleviates pain, palpation aggravates pain. Denies nausea, vomiting, diarrhea, constipation.   2/24/22 Melodie is here today for f/u and treatment. She has stage IIB ([id number]) IDC of the left breast, G3, triple positive, s/p left skin sparing modified radical mastectomy, SLNB and ALND, right skin sparing simple mastectomy and SLNB followed by b/l msTRAM free flap reconstruction. She completed adjuvant chemotherapy with TCHP x 6. She continues Herceptin and Perjeta every 3 weeks. She completed PMRT on 11/16/21. She is taking adjuvant endocrine therapy with letrozole since 9/2021 and tolerating fairlty well.  She continues to have joint pains but not affecting her quality of life. She complains symptoms of neuropathy with intermittent numbness in her hands and feet.  She now has difficulty buttoning her sleeves.. She is taking Gabapentin 300mg prn only, Vitamin B1 and B6. She requests for refills only be done by Dr. Early due to [employer] insurance requirements. She saw Dr. Trinh and scheduled for hysterectomy on 3/17/22.  4/14/22: Melodie is here today for f/u and treatment. She has stage IIB ([id number]) IDC of the left breast, G3, triple positive, s/p left skin sparing modified radical mastectomy, SLNB and ALND, right skin sparing simple mastectomy and SLNB followed by b/l msTRAM free flap reconstruction. She completed adjuvant chemotherapy with TCHP x 6. She continues Herceptin and Perjeta every 3 weeks. She completed PMRT on 11/16/21. She is taking adjuvant endocrine therapy with letrozole since 9/2021 and tolerating well.  She has some arthralgia. She still feels neuropathy with intermittent numbness in her hands and feet. She is taking Gabapentin 300mg prn only, Vitamin B1 and B6. She had BSO and  hysterectomy on 3/17/22.  7/18/22: Melodie is here today for f/u and treatment. She has stage IIB ([id number]) IDC of the left breast, G3, triple positive, s/p left skin sparing modified radical mastectomy, SLNB and ALND, right skin sparing simple mastectomy and SLNB followed by b/l msTRAM free flap reconstruction. She completed adjuvant chemotherapy with TCHP x 6 and finished one year Herceptin and Perjeta. She completed PMRT on 11/16/21. She is taking adjuvant endocrine therapy with letrozole since 9/2021 and tolerating well.  She has some arthralgia. She still feels neuropathy with intermittent numbness in her hands and feet. She is taking Gabapentin 300mg prn but wants to stop.  She had BSO and  hysterectomy on 3/17/22. She feels some hot flashes.   03/01/2023 Melodie is here today for f/u and treatment. She has stage IIB ([id number]) IDC of the left breast, G3, triple positive, s/p left skin sparing modified radical mastectomy, SLNB and ALND, right skin sparing simple mastectomy and SLNB followed by b/l msTRAM free flap reconstruction. She completed adjuvant chemotherapy with TCHP x 6 and finished one year Herceptin and Perjeta. She completed PMRT on 11/16/21. She is taking adjuvant endocrine therapy with letrozole since 9/2021 and tolerating well.  She has some arthralgia. She still feels neuropathy with intermittent numbness in her hands and feet. She stopped taking Gabapentin .  She had BSO and  hysterectomy on 3/17/22. She  endorses right lower spine and hip bone pain constant 6/10 but some days it can worsen, with no alleviating factors. Patient also complaining of insomnia not improved with melatonin use or OTC sleep aids, patient prescribed Ambien in the past  but inconsistent with use.   9/7/23 Melodie is here today for f/u and treatment. She has stage IIB ([id number]) IDC of the left breast, G3, triple positive, s/p left skin sparing modified radical mastectomy, SLNB and ALND, right skin sparing simple mastectomy and SLNB followed by b/l msTRAM free flap reconstruction. She completed adjuvant chemotherapy with TCHP x 6 and finished one year Herceptin and Perjeta. She completed PMRT on 11/16/21. She is taking adjuvant endocrine therapy with letrozole since 9/2021 and tolerating well.  She has some arthralgia. She still feels neuropathy with intermittent numbness in her hands and feet. She stopped taking Gabapentin .  She had BSO and  hysterectomy on 3/17/22. She  endorses right lower spine and hip bone pain constant 6/10 but some days it can worsen, with no alleviating factors, bone scan was done 3/2023 which was negative for metastatic bone disease. Patient also complaining of insomnia not improved with melatonin use or OTC sleep aids, patient prescribed Ambien in the past  but inconsistent with use. In July she felt lump on right breast USG biopsy was performed Final Diagnosis: Chest wall, right mass, ultrasound-guided needle core biopsies: Benign fibroadipose connective tissue with microscopic foci of remote fat necrosis. Unremarkable fragments of hyaline cartilage.  3/14/24 Melodie is here today for f/u and treatment. She has stage IIB ([id number]) IDC of the left breast, G3, triple positive, s/p left skin sparing modified radical mastectomy, SLNB and ALND, right skin sparing simple mastectomy and SLNB followed by b/l msTRAM free flap reconstruction. She completed adjuvant chemotherapy with TCHP x 6 and finished one year Herceptin and Perjeta. She completed PMRT on 11/16/21. She is taking adjuvant endocrine therapy with letrozole since 9/2021 and tolerating well.  She has some arthralgia. She still feels neuropathy with intermittent numbness in her hands and feet. She stopped taking Gabapentin .  She had BSO and  hysterectomy on 3/17/22. She  endorses right lower spine and hip bone pain constant 6/10 but some days it can worsen, with no alleviating factors, bone scan was done 3/2023 which was negative for metastatic bone disease. Patient also complaining of insomnia not improved with melatonin use or OTC sleep aids, patient prescribed Ambien in the past  but inconsistent with use. In July she felt lump on right breast USG biopsy was performed Final Diagnosis: Chest wall, right mass, ultrasound-guided needle core biopsies: Benign fibroadipose connective tissue with microscopic foci of remote fat necrosis. Unremarkable fragments of hyaline cartilage. She had shingles 2 months ago to right thigh. SHe reports new right breast lump noticed about 1 month ago.   9/18/24: Melodie is here today for f/u and treatment. She has stage IIB ([id number]) IDC of the left breast, G3, triple positive, s/p left skin sparing modified radical mastectomy, SLNB and ALND, right skin sparing simple mastectomy and SLNB followed by b/l msTRAM free flap reconstruction. She completed adjuvant chemotherapy with TCHP x 6 and finished one year Herceptin and Perjeta. She completed PMRT on 11/16/21. She has been taking adjuvant endocrine therapy with letrozole since 9/2021 and tolerating well. She had BSO and hysterectomy on 3/17/22. She is feeling well and does not have new complains.   3/19/25 Melodie is here today for f/u and treatment. She has stage IIB ([id number]) IDC of the left breast, G3, triple positive, s/p left skin sparing modified radical mastectomy, SLNB and ALND, right skin sparing simple mastectomy and SLNB followed by b/l msTRAM free flap reconstruction. She completed adjuvant chemotherapy with TCHP x 6 and finished one year Herceptin and Perjeta. She completed PMRT on 11/16/21. She has been taking adjuvant endocrine therapy with letrozole since 9/2021. She had BSO and hysterectomy on 3/17/22. She feels generalized aches and pain.

## 2025-03-21 NOTE — ASSESSMENT
[FreeTextEntry1] : 52 yo post menopause female has stage IIB ([id number]) IDC of the left breast, G3, triple positive, s/p left skin sparing modified radical mastectomy, SLNB and ALND, right skin sparing simple mastectomy and SLNB followed by b/l msTRAM free flap reconstruction, s/p PMRT. S/P prophylactic BSO and hysterectomy.  Assessment and Plan: -- Continue Letrozole daily. AI related side effects reviewed.   -- S/P b/l mastectomy and reconstruction. Breast exam did not reveal palapble abnormality.  -- Osteopenia. Continue calcium and vitamin D supplement. Encourage regular exercise.  -- Bone pain in the Hip and back. Bone scan was negative for metastatic bone disease.   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  -- Insomnia. She takes Ambien 10 mg hs prn. -- S/P PMRT. Followup with Dr. Todd as scheduled. -- CBC , BMP ,LFT CEA , CA15.3 today  -- RTO for followup in 6 months. Advised to call if she has any new concern.

## 2025-03-21 NOTE — HISTORY OF PRESENT ILLNESS
[de-identified] : 49 yo female is referred by Dr. Correa for consultation adjuvant systemic therapy for breast cancer. Lilly first palpated a mass in her left retroareolar breast in 2020. Diagnostic b/l mammo on 2020 showed 1.5 cm spiculated mass in the left breast  @6:00, retroareolar, correlating with patient's palpable concerns. The left breast US showed 1 cm  hypoechoic mass at 6:00 retroareolar region and 8 mm hypoechoic mass at 5:00 retroareolar region. There was no suspicious axillary adenopathy.  \par  \par  On 21, she had US guided core biopsies x 2. \par  1. @5:00, retroareolar (hourglass)\par  -IDC, moderately to poorly differentiated \par  -focal DCIS, intermediate to high nuclear grade \par  -ER pos %, AR pos 71-80%, Her 2 pos (3+ by IHC), Ki67:30%\par  \par  2. @6:00, retroareolar (cork clip) \par  -IDC, moderately to poorly differentiated \par  -focal DCIS, intermediate to high nuclear grade \par  -ER pos %, AR pos 21-30% (Alyse /), Her 2 pos (3+ by IHC), Ki67:30%\par   \par  She saw Dr. Correa for surgical consultation and elected to have b/l mastectomy. She had CT angio chest, abdomen and pelvis on 21 for breast reconstruction. These imaging studies did not reveal evidence of distant metastasis.\par  \par  She has a family h/o of breast cancer and colon cancer.  She had germline genetic panel study and she is negative for mutations. \par  \par  On  3/24/21, she underwent left breast skin sparing modified radical mastectomy. The pathology revealed 3.0 cm invasive poorly differentiated ductal carcinoma in retroareolar/upper inner quadrant, Non-extensive ductal carcinoma in-situ (DCIS), solid type, high nuclear grade with pagetoid spread to involve the major lactiferous ducts/sinuses.  However, no involvement of the overlying epidermis is seen. No lymphovascular invasion is identified. Unremarkable nipple, skin, and deep fascial margin.  Negative for carcinoma. Two sentinel lymph nodes were removed. Metastatic carcinoma virtually replacing one lymph node (), 1.9 cm (macroscopic measurement). No definitive foci of extracapsular extension are seen. Left ALND removed Nineteen axillary lymph nodes and they are all negative for malignancy (0/19).  AJCC 8th Edition Pathologic Stage:  pT2, pN1a, pMx.\par  Right skin sparing simple mastectomy showed benign fibrofatty breast tissue with proliferative type fibrocystic changes. Nipple, skin, and deep fascial margin without histopathologic abnormality. One right axillary SLN is negative for malignancy. \par  \par  She had s/p BL msTRAM free flap reconstruction by Dr. Morataya. She recovered well from surgery. She is here today with her  to discuss adjuvant systemic therapy. \par  \par  She has PMH of SAHRA on CPAP, obesity s/p gastric sleeve  (lost 80 lbs), migraines (gets botox q12 weeks), asthma (no hospitalizations since childhood), gastritis, HLD,\par  She is  and has been menopause since 2016. \par   [de-identified] : 5/21/21- Patient is here for follow up. 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 She had CT c/a/p prior to surgery which did not show evidence of distant metastasis. She has recovered well from surgery. She is here today to start chemotherapy.  Her Echo in Jan 2021 showed EF 65%. She had port catheter placement.   6/11/21-  Patient is here for follow up and chemotherapy.  She has stage IIB ([id number]) IDC of the left breast, G3, triple positive, s/p left skin sparing modified radical mastectomy, SLNB and ALND, right skin sparing simple mastectomy and SLNB followed by b/l msTRAM free flap reconstruction. She had CT c/a/p prior to surgery which did not show evidence of distant metastasis. She is on adjuvant chemotherapy with TCHP regimen. She is due for cycle 2 chemo today. She tolerated cycle 2 better than cycle 1.  Pt c/o severe insomnia.  She has h/o insomnia but melatonin not helping.  pretty well. She had some nausea that was better with medications. She had c/o severe back pain 2 days after the Neulasta shot that resolved in 1-2 days. She also had mucositis that is better with hurricane solution Also had c/o gastritis. .Her Echo in Jan 2021 showed EF 65%. She is able to eat well and her weight is stable. No fever or chills.   7/1/21: The patient is here today for f/u and chemotherapy. She has stage IIB ([id number]) IDC of the left breast, G3, triple positive, s/p left skin sparing modified radical mastectomy, SLNB and ALND, right skin sparing simple mastectomy and SLNB followed by b/l msTRAM free flap reconstruction. She is on adjuvant chemotherapy with TCHP regimen. To date, she has received 2 cycles. She tolerated cycle 2 better than cycle 1. She complains insomnia. She had some nausea that was better with medications. She had c/o severe back pain 2 days after the Neulasta shot that resolved in 1-2 days. She had mucositis and sore throat which has resolved.  She has a small opening along the incision of her left reconstructed breast. She saw Dr. Morataya and it has healed.    7/22/21 The patient is here today for f/u and chemotherapy. She has stage IIB ([id number]) IDC of the left breast, G3, triple positive, s/p left skin sparing modified radical mastectomy, SLNB and ALND, right skin sparing simple mastectomy and SLNB followed by b/l msTRAM free flap reconstruction. She is on adjuvant chemotherapy with TCHP regimen. To date, she has received 3 cycles. She complains abdominal retention, nausea, heartburn, decreased taste, diarrhea but controlled with Imodium.  Mucositis and sore throat have improved. New symptoms: mild eye tearing and slight paresthesias.  8/12/21: The patient is here today for f/u and chemotherapy. She has stage IIB ([id number]) IDC of the left breast, G3, triple positive, s/p left skin sparing modified radical mastectomy, SLNB and ALND, right skin sparing simple mastectomy and SLNB followed by b/l msTRAM free flap reconstruction. She is on adjuvant chemotherapy with TCHP regimen. To date, she has received 4 cycles. She complains nausea, diarrhea, feeling bloating, and arthralgia after previous cycle of chemo. She took Imodium for diarrhea.   9/3/21: The patient is here today for f/u and chemotherapy. She has stage IIB ([id number]) IDC of the left breast, G3, triple positive, s/p left skin sparing modified radical mastectomy, SLNB and ALND, right skin sparing simple mastectomy and SLNB followed by b/l msTRAM free flap reconstruction. She is on adjuvant chemotherapy with TCHP regimen. To date, she has received 5 cycles. Foe her previous cycle chemo, Emend was given to relieve n/v. She felt better. She was taking Imodium for diarrhea.   9/23/21: The patient is here today for f/u and treatment. She has stage IIB ([id number]) IDC of the left breast, G3, triple positive, s/p left skin sparing modified radical mastectomy, SLNB and ALND, right skin sparing simple mastectomy and SLNB followed by b/l msTRAM free flap reconstruction. She completed adjuvant chemotherapy with TCHP x 6. She saw Dr. Quinn and will start PMRT. She started adjuvant endocrine therapy with letrozole for a couple of weeks. She reports diarrhea 3-4 time per day.  She developed neuropathy and pain and numbness/tingling of her hands and feet.  10/13/21: The patient is here today for f/u and treatment. She has stage IIB ([id number]) IDC of the left breast, G3, triple positive, s/p left skin sparing modified radical mastectomy, SLNB and ALND, right skin sparing simple mastectomy and SLNB followed by b/l msTRAM free flap reconstruction. She completed adjuvant chemotherapy with TCHP x 6. She saw Dr. Quinn and started PMRT. She is taking adjuvant endocrine therapy with letrozole and tolerated well. Her bone density from 10/4/21 was normal. She complains symptoms of neuropathy with numbness in her hands and feet. She is taking Gabapentin, Vitamin B1 and B6.   11/24/21 Melodie is here today for f/u and treatment. She has stage IIB ([id number]) IDC of the left breast, G3, triple positive, s/p left skin sparing modified radical mastectomy, SLNB and ALND, right skin sparing simple mastectomy and SLNB followed by b/l msTRAM free flap reconstruction. She completed adjuvant chemotherapy with TCHP x 6. She continues Herceptin and Perjeta every 3 weeks. She completed PMRT on 11/16/21. She is taking adjuvant endocrine therapy with letrozole and tolerated well. She complains symptoms of neuropathy with intermittent numbness in her hands and feet. She is taking Gabapentin, Vitamin B1 and B6. She had bone density done 10/13 which was normal. Echocardiogram done 11/9/21 EF 64%.   12/23/21 Melodie is here today for f/u and treatment. She has stage IIB ([id number]) IDC of the left breast, G3, triple positive, s/p left skin sparing modified radical mastectomy, SLNB and ALND, right skin sparing simple mastectomy and SLNB followed by b/l msTRAM free flap reconstruction. She completed adjuvant chemotherapy with TCHP x 6. She continues Herceptin and Perjeta every 3 weeks. She completed PMRT on 11/16/21. She is taking adjuvant endocrine therapy with letrozole and tolerated well. She complains symptoms of neuropathy with intermittent numbness in her hands and feet, improving. She is taking Gabapentin, Vitamin B1 and B6. She had bone density done 10/13 which was normal. Echocardiogram done 11/9/21 EF 64%. She offers no new complaints.   1/13/22 Melodie is here today for f/u and treatment. She has stage IIB ([id number]) IDC of the left breast, G3, triple positive, s/p left skin sparing modified radical mastectomy, SLNB and ALND, right skin sparing simple mastectomy and SLNB followed by b/l msTRAM free flap reconstruction. She completed adjuvant chemotherapy with TCHP x 6. She continues Herceptin and Perjeta every 3 weeks. She completed PMRT on 11/16/21. She is taking adjuvant endocrine therapy with letrozole and tolerated well. She complains symptoms of neuropathy with intermittent numbness in her hands and feet, improving. She is taking Gabapentin, Vitamin B1 and B6. She saw GYN for annual follow up. She reports left upper quadrant pain under breast x 2 months. Pain is reproducible, it is described as a dull aching, occasional sharp pain. Nothing alleviates pain, palpation aggravates pain. Denies nausea, vomiting, diarrhea, constipation.   2/24/22 Melodie is here today for f/u and treatment. She has stage IIB ([id number]) IDC of the left breast, G3, triple positive, s/p left skin sparing modified radical mastectomy, SLNB and ALND, right skin sparing simple mastectomy and SLNB followed by b/l msTRAM free flap reconstruction. She completed adjuvant chemotherapy with TCHP x 6. She continues Herceptin and Perjeta every 3 weeks. She completed PMRT on 11/16/21. She is taking adjuvant endocrine therapy with letrozole since 9/2021 and tolerating fairlty well.  She continues to have joint pains but not affecting her quality of life. She complains symptoms of neuropathy with intermittent numbness in her hands and feet.  She now has difficulty buttoning her sleeves.. She is taking Gabapentin 300mg prn only, Vitamin B1 and B6. She requests for refills only be done by Dr. Early due to [employer] insurance requirements. She saw Dr. Trinh and scheduled for hysterectomy on 3/17/22.  4/14/22: Melodie is here today for f/u and treatment. She has stage IIB ([id number]) IDC of the left breast, G3, triple positive, s/p left skin sparing modified radical mastectomy, SLNB and ALND, right skin sparing simple mastectomy and SLNB followed by b/l msTRAM free flap reconstruction. She completed adjuvant chemotherapy with TCHP x 6. She continues Herceptin and Perjeta every 3 weeks. She completed PMRT on 11/16/21. She is taking adjuvant endocrine therapy with letrozole since 9/2021 and tolerating well.  She has some arthralgia. She still feels neuropathy with intermittent numbness in her hands and feet. She is taking Gabapentin 300mg prn only, Vitamin B1 and B6. She had BSO and  hysterectomy on 3/17/22.  7/18/22: Melodie is here today for f/u and treatment. She has stage IIB ([id number]) IDC of the left breast, G3, triple positive, s/p left skin sparing modified radical mastectomy, SLNB and ALND, right skin sparing simple mastectomy and SLNB followed by b/l msTRAM free flap reconstruction. She completed adjuvant chemotherapy with TCHP x 6 and finished one year Herceptin and Perjeta. She completed PMRT on 11/16/21. She is taking adjuvant endocrine therapy with letrozole since 9/2021 and tolerating well.  She has some arthralgia. She still feels neuropathy with intermittent numbness in her hands and feet. She is taking Gabapentin 300mg prn but wants to stop.  She had BSO and  hysterectomy on 3/17/22. She feels some hot flashes.   03/01/2023 Melodie is here today for f/u and treatment. She has stage IIB ([id number]) IDC of the left breast, G3, triple positive, s/p left skin sparing modified radical mastectomy, SLNB and ALND, right skin sparing simple mastectomy and SLNB followed by b/l msTRAM free flap reconstruction. She completed adjuvant chemotherapy with TCHP x 6 and finished one year Herceptin and Perjeta. She completed PMRT on 11/16/21. She is taking adjuvant endocrine therapy with letrozole since 9/2021 and tolerating well.  She has some arthralgia. She still feels neuropathy with intermittent numbness in her hands and feet. She stopped taking Gabapentin .  She had BSO and  hysterectomy on 3/17/22. She  endorses right lower spine and hip bone pain constant 6/10 but some days it can worsen, with no alleviating factors. Patient also complaining of insomnia not improved with melatonin use or OTC sleep aids, patient prescribed Ambien in the past  but inconsistent with use.   9/7/23 Melodie is here today for f/u and treatment. She has stage IIB ([id number]) IDC of the left breast, G3, triple positive, s/p left skin sparing modified radical mastectomy, SLNB and ALND, right skin sparing simple mastectomy and SLNB followed by b/l msTRAM free flap reconstruction. She completed adjuvant chemotherapy with TCHP x 6 and finished one year Herceptin and Perjeta. She completed PMRT on 11/16/21. She is taking adjuvant endocrine therapy with letrozole since 9/2021 and tolerating well.  She has some arthralgia. She still feels neuropathy with intermittent numbness in her hands and feet. She stopped taking Gabapentin .  She had BSO and  hysterectomy on 3/17/22. She  endorses right lower spine and hip bone pain constant 6/10 but some days it can worsen, with no alleviating factors, bone scan was done 3/2023 which was negative for metastatic bone disease. Patient also complaining of insomnia not improved with melatonin use or OTC sleep aids, patient prescribed Ambien in the past  but inconsistent with use. In July she felt lump on right breast USG biopsy was performed Final Diagnosis: Chest wall, right mass, ultrasound-guided needle core biopsies: Benign fibroadipose connective tissue with microscopic foci of remote fat necrosis. Unremarkable fragments of hyaline cartilage.  3/14/24 Melodie is here today for f/u and treatment. She has stage IIB ([id number]) IDC of the left breast, G3, triple positive, s/p left skin sparing modified radical mastectomy, SLNB and ALND, right skin sparing simple mastectomy and SLNB followed by b/l msTRAM free flap reconstruction. She completed adjuvant chemotherapy with TCHP x 6 and finished one year Herceptin and Perjeta. She completed PMRT on 11/16/21. She is taking adjuvant endocrine therapy with letrozole since 9/2021 and tolerating well.  She has some arthralgia. She still feels neuropathy with intermittent numbness in her hands and feet. She stopped taking Gabapentin .  She had BSO and  hysterectomy on 3/17/22. She  endorses right lower spine and hip bone pain constant 6/10 but some days it can worsen, with no alleviating factors, bone scan was done 3/2023 which was negative for metastatic bone disease. Patient also complaining of insomnia not improved with melatonin use or OTC sleep aids, patient prescribed Ambien in the past  but inconsistent with use. In July she felt lump on right breast USG biopsy was performed Final Diagnosis: Chest wall, right mass, ultrasound-guided needle core biopsies: Benign fibroadipose connective tissue with microscopic foci of remote fat necrosis. Unremarkable fragments of hyaline cartilage. She had shingles 2 months ago to right thigh. SHe reports new right breast lump noticed about 1 month ago.   9/18/24: Melodie is here today for f/u and treatment. She has stage IIB ([id number]) IDC of the left breast, G3, triple positive, s/p left skin sparing modified radical mastectomy, SLNB and ALND, right skin sparing simple mastectomy and SLNB followed by b/l msTRAM free flap reconstruction. She completed adjuvant chemotherapy with TCHP x 6 and finished one year Herceptin and Perjeta. She completed PMRT on 11/16/21. She has been taking adjuvant endocrine therapy with letrozole since 9/2021 and tolerating well. She had BSO and hysterectomy on 3/17/22. She is feeling well and does not have new complains.   3/19/25 Melodie is here today for f/u and treatment. She has stage IIB ([id number]) IDC of the left breast, G3, triple positive, s/p left skin sparing modified radical mastectomy, SLNB and ALND, right skin sparing simple mastectomy and SLNB followed by b/l msTRAM free flap reconstruction. She completed adjuvant chemotherapy with TCHP x 6 and finished one year Herceptin and Perjeta. She completed PMRT on 11/16/21. She has been taking adjuvant endocrine therapy with letrozole since 9/2021. She had BSO and hysterectomy on 3/17/22. She feels generalized aches and pain.

## 2025-04-30 NOTE — DATA REVIEWED
[FreeTextEntry1] : Tisha Accession Number : [id number] Patient:     KYAW BRANDON   Accession:                             [id number]  Collected Date/Time:                   10/2/2024 10:20 EDT Received Date/Time:                    10/2/2024 11:20 EDT  Surgical Pathology Report - Auth (Verified)  Specimen(s) Submitted Right breast mastectomy flap  Final Diagnosis Breast, right tissue and skin, PAULO flap reconstruction: - Benign fibrofatty breast tissue, skin, and subcutaneous adipose tissue with focal remote postsurgical site changes including suture granulomata.  Verified by: Hari James M.D. (Electronic Signature) Reported on: 10/04/24 14:09 EDT, [place] [address] Phone: [phone]   Fax: [phone]

## 2025-04-30 NOTE — PHYSICAL EXAM
[de-identified] : well-appearing, NAD [de-identified] : good inspiratory effort [de-identified] : no gross truncal asymmetry [de-identified] : Bilateral PAULO flaps warm soft with good cap refill, mastectomy flaps well perfused; right and left breast well healed incisions, significantly improved breast symmetry, no palpable masses. No axillary masses  [de-identified] : incision healed, mature scar, no keloids

## 2025-04-30 NOTE — HISTORY OF PRESENT ILLNESS
[FreeTextEntry1] : Pt is a 49 y/o F, , postmenopausal, with PMH of SAHRA on CPAP, obesity s/p gastric sleeve  (lost 80 lbs), migraines (gets botox q12 weeks), asthma (no hospitalizations since childhood), gastritis, HLD, and anxiety who presents for breast reconstruction consult. Pt was recently diagnosed with LEFT breast IDC, [id number], ER/VA pos, Her 2 pos, stage 1 breast cancer. Pt self-palpated a lump in 2020. Denies nipple discharge or inversion.  Per consultation with Dr. Correa she is a great candidate for either breast conservation surgery + XRT or mastectomy, both would require SLNBx. Patient is leaning towards bilateral mastectomy. Pt states she does not want implants.  H/o right breast cyst aspiration. No prior breast biopsies or surgeries  Family h/o Breast CA: two maternal aunts dx at age 46 and 47   Ht 5'6" Wt 200lb; weight stable Current bra size: 38D-DD Pt would like to be "as small as possible" --B cup or small C cup. C/o back and neck pain as well as shoulder grooving Nonsmoker Denies h/o VTE or MRSA infections Occ:   Interval hx (21): Pt presents today for follow up. Had CTA chest and abd/pel for preoperative planning as recommended. Still interested in free flap reconstruction and again endorses that she does not want implants.  No new health issues.  Interval hx (21):  POD#9 s/p BL msTRAM free flap.  Ambulating.  Compliant with abd binder and surgical bra.  Denies fevers, chills, swelling, calf pain, SOB, CP.   Only requiring Tylenol and gabapentin and celebrex.  Has not require xanax since hospitalization.  RADHA drains LSL 20/10/minimal; LIMF trace/trace/trace); RSL 10/15/5; RIMF 15/10/15; LAbd 60/30/50/20; RAbd 70/30/50/20  Interval hx (21): POD #16 s/p BL msTRAM free flap. Feeling well. Ambulating and doing PT/OT. Taking only Tylenol. Denies f/c, CP/SOB, abdominal pain, or calf pain. Drain output: BL breasts <5cc each daily, right abdomen 10-15cc/day.  Interval hx (21): POD #23 s/p BL msTRAM free flap. Feeling well. C/o mild heavy sensation to BL breasts,L>R, but otherwise feeling well and happy with results.  Interval hx (5/10/21):  6 weeks s/p BL msTRAM free flap.  Feeling well and happy with results. Recently had portacath placed; due to start CTX 21.  Interval hx (21)  3 months s/p BL msTRAM free flap.  pt is very happy with results.  pt has 4 cycles of chemo left; unknown at this time if XRT needed.  Pt would like to have smaller breast volume if possible at future revision.  Interval hx (21):  9 months s/p BL msTRAM free flap. Completed chemotherapy 2021. Completed radiation 2021. On letrozole and Herceptin + Perjeta. Taking gabapentin and Vitamin B complex for neuropathy. Still interested in being smaller.  Interval hx (22):  here for annual follow s/p  BL msTRAM free flap. Completed chemotherapy 2021. Completed radiation 2021.  s/p ADAM/BSO.  DC from [facility] therapy.  Performed compression and wearing sleeve.  Actively losing weight.  Pt still desires BL breast recon revision with debulking; she feels the size is too large.  Interval hx (12/15/23)  Here for annual check.  s/p BL msTRAM free flap. Completed chemotherapy 2021. Completed left breast radiation 2021.  She has since lost weight intentionally (-80 lbs).  Now 142 lbs after Ozempic; she is now being weaned off.  Pt still feels her BL breast are too large.  Wearing bralettes.  Here to discuss breast debulking options.  Interval hx (24). Pt here POD#7 s/p revision BL breast reconstruction. Doing well with no significant pain, f/c or bleeding. Took all prescribed medications.   Interval hx (24). Pt here 5 weeks s/p revision BL breast reconstruction. She is happy with results. Endorses right breast has dropped more than left breast. Left breast is softer than before surgery.  Interval hx (24):  11.5 weeks s/p revision BL breast reconstruction.  She is here for confirmation if she would like to proceed with additional revisional procedures.   Interval hx (10/9/24). Pt presents today POD#7 s/p revision of right breast reconstruction. Doing very well and happy with results. Denies any pain, f/c or bleeding.   Interval hx (24):  5 weeks s/p revision of right breast reconstruction. Doing very well and happy with results.  She feels better in her clothes  Interval hx (25):  7 months s/p revision of right breast reconstruction (10/2/25). Doing very well and happy with results.   Here to discuss nipple recon/ +/- tattooing

## 2025-04-30 NOTE — ASSESSMENT
[FreeTextEntry1] : 55 y/o F with newly diagnosed LEFT breast [id number], ER/CO pos, Her 2 pos, stage 1 breast cancer, planning to undergo bilateral skin-sparing mastectomy with SLNBx, possible ALND  s/p BL free msTRAP flap breast reconstruction, doing well with excellent result Left breast hematoma aspirated 4/9: 15cc, 4/16: negative completed left breast XRT 11/2021  BL reconstructed breast asymmetry s/p massive weight loss indicated for revision.  s/p BL reconstructed breast revision with direct excision. Doing well.   7 months s/p right breast reconstruction revision with direct excision (lift). Doing very well and happy with results.   -Regarding NAC reconstruction, the risks include but are not limited to bleeding, infection, hematoma, poor wound healing, tissue necrosis, loss of reconstructed NAC projection, NAC asymmetry and possible dissatisfaction with outcome. -Regarding medical tattooing, the risks include infection, irregular tattoo take, color difference compared to contralateral native NAC, need for serial tattooing, possible dissatisfaction with outcome. -The patient had the opportunity to ask questions, and all were answered to their apparent satisfaction. -The patient would like to consider her options - silicone tape to left breast scar x 1 month - no physical activity restrictions - no bra support needed at night; DC nightly support bra - all her questions were answered  - f/u PRN when she has confirmed NAC tx decisions, post-op photos at next visit as pre-op photos

## 2025-07-07 NOTE — HISTORY OF PRESENT ILLNESS
[FreeTextEntry1] : Ms. Castaneda is a 54-year-old woman with history of breast CA s/p lumpectomy/hysterectomy/chemo/RT, morbid obesity s/p gastric sleeve, SAHRA on CPAP, asthma, and GERD presenting for follow up.  Patient was seen by pulmonary medicine on 6/27/22 for history of SAHRA and asthma. She was referred for a CPAP titration study and PFTs.   First visit: - "...patient states she is here because she was recently diagnosed with hypertension and started on amlodipine 2.5mg daily. She attends occupational therapy and during her exercise does not have any complaints including chest discomfort, dyspnea, palpitations, nausea, back pain, or syncope. She overall is feeling better since completing therapy for her breast cancer and having her chemoport removed."  Today is doing well, denies active cardiopulmonary complaints. She has lost about 50 pounds over the past year as part of the obesity clinic with use of Ozempic. This has since been discontinued.  TTE 7/2023 - Normal biventricular function, GLS -21%, no significant valvular disease  Notable labs:  - , TG 43, HDL 93, , non- -> LDL 85, TG 38 - Cr 0.7, K 4.3 - TSH 1.99, A1c 5.3%, pBNP 70, hsCRP 0.19 - , , HDL 98, TG 31, non- - 7/2022: , , HDL 94, TG 59  Noted Meds: - Rosuvastatin 20mg - Amlodipine 5mg - Ozempic (stopped)

## 2025-07-07 NOTE — CARDIOLOGY SUMMARY
[de-identified] : 4/27/22: Normal global LV systolic function, EF 73%, mildly increased LV thickness, GLS -22.1%, mild LA enlargement.

## 2025-07-07 NOTE — PHYSICAL EXAM
[Well Developed] : well developed [Well Nourished] : well nourished [No Acute Distress] : no acute distress [Normal Conjunctiva] : normal conjunctiva [Normal Venous Pressure] : normal venous pressure [No Carotid Bruit] : no carotid bruit [Normal S1, S2] : normal S1, S2 [No Rub] : no rub [No Gallop] : no gallop [Clear Lung Fields] : clear lung fields [Good Air Entry] : good air entry [No Respiratory Distress] : no respiratory distress  [Soft] : abdomen soft [Non Tender] : non-tender [No Masses/organomegaly] : no masses/organomegaly [Normal Bowel Sounds] : normal bowel sounds [Normal Gait] : normal gait [No Edema] : no edema [No Cyanosis] : no cyanosis [No Clubbing] : no clubbing [No Varicosities] : no varicosities [No Rash] : no rash [No Skin Lesions] : no skin lesions [Moves all extremities] : moves all extremities [No Focal Deficits] : no focal deficits [Normal Speech] : normal speech [Alert and Oriented] : alert and oriented [Normal memory] : normal memory [de-identified] : 2/6 systolic murmur apex

## 2025-07-07 NOTE — ASSESSMENT
[FreeTextEntry1] : Ms. Castaneda is a 54-year-old woman with history of breast CA s/p lumpectomy/hysterectomy/chemo/RT, morbid obesity s/p gastric sleeve, SAHRA on CPAP, asthma, and GERD presenting to establish care with cardiology for management of hypertension.  Impression: (1) Benign essential hypertension, well controlled (2) Hx of SAHRA on CPAP, reported compliance (3) Hx of asthma (4) hx of GERD (5) HLD, ,. + family history of premature ASCVD in her mother (60s). Non-contrast chest CT reviewed: no evidence of coronary calcifications. (6) Hx of breast cancer, recently completed radiation therapy  Plan: - Repeat TTE for post-radiation cardiac surveillance - Labs and imaging discussed in detail. Lifestyle modification re-discussed. - Encouraged CPAP use. - Primordial and primary ASCVD risk prevention was discussed on a prior visit. Patient was informed of the "Essential 8" guidelines for health including: (1) physical activity (>150min moderate or >75min vigorous exercise per week); (2) diet [I recommend a pesco-Mediterranean diet with ~16 hours of daily fasting]; (3) adequate/good quality sleep; (4) weight loss (BMI <25); (5) avoidance/cessation of smoking; (6) cholesterol (LDL as low as possible; TChol <200); (7) BP control (<120/80); (8) glucose control (fasting BG <100). Source: https://www.ahajournals.org/doi/epdf/10.1161/CIR.9330860821196994  Follow up 6 months, sooner as needed.

## 2025-07-17 NOTE — DATA REVIEWED
[FreeTextEntry1] : ACC: [id number]     EXAM:  MG US BREAST COMPLETE LT   ORDERED BY: ALBERT CABALLERO  PROCEDURE DATE:  02/19/2025    INTERPRETATION:  CLINICAL HISTORY: Left axillary palpable concern and diffuse left-sided pain. Post bilateral mastectomy Technique: Whole left breast ultrasound Comparison: None available  Findings:  No suspicious mass or cyst is identified in the region of the palpable concern in the left axilla or reconstructed left breast  Impression:  No sonographic evidence of malignancy  Recommendation: Any decision to biopsy the palpable abnormality should be based on the level of clinical concern.  BI-RADS Category 1: Negative

## 2025-07-17 NOTE — ASSESSMENT
[FreeTextEntry1] : Lilly is a 54 F with a L breast IDC (+/+/-) s/p b/l skin sparing mastectomy, right sentinel lymph node biopsy, left axillary lymph node dissection with bilateral msTRAM flap reconstruction on 3/24/2021: Left 3cm IDC, 1/21 nodes; R benign 0/1 node. [id number]  S/p adjuvant TCHP. s/p L PMRT. On letrozole.  S/P prophylactic BSO and hysterectomy 3/2022.  She underwent bilateral revision of breast reconstruction in 2024.  HISTORICAL RISK FACTORS:  -family history of breast cancer in two maternal aunts at ages 46 and 47  -, age at first live birth was 20  -prior OCP use x 3 years, stopped in  -no gyn surgeries   She had the following work up:  12/10/2020 -- b/l dx mammogram and US  -scattered areas of fibroglandular densities -no suspicious mass, microcalcifications or architectural distortion in R  -LEFT: @6:00, retroareolar, correlating with patient's palpable concerns, 1.5 cm spiculated mass  b/l US  -RIGHT: no suspicious abnormalities  -LEFT: @6:00, retroareolar, hypoechoic irregular mass, measuring 1 x 0.7 x 0.8 cm --> BIOPSY;  @5:00, retroareolar, hypoechoic irregular mass, measuring 8 x 8 x 7 mm --> BIOPSY  -no suspicious lymphadenopathy  BIRADS 5   2021 -- L US CNBx x 2  1. @5:00, retroareolar (hourglass) -IDC, moderately to poorly differentiated  -focal DCIS, intermediate to high nuclear grade  -ER/[place] pos (Alyse 8/4), Her 2 pos on IHC, Ki67:30%  2. 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 L breast -IDC, poorly differentiated -T= 3 cm  -DCIS, high nuclear grade  -no LVI  -1/2 SLN positive for metastatic disease  -19 benign left axillary lymph nodes   2. R breast  -benign fibrofatty breast tissue  -1 negative sentinel lymph node   S/p adjuvant TCHP. s/p L PMRT. On letrozole. S/P prophylactic BSO and hysterectomy 3/2022.  23: R US --> BIRADS 4 Targeted unilateral right chest wall ultrasound was performed. At 1:00 N3 area of palpable concern in the right chest wall there is a round heterogeneous mass measuring 0.6 x 0.8 x 0.4 cm. Ultrasound-guided biopsy recommended.  2023: USGB, R Chest wall, right mass, ultrasound-guided needle core biopsies:(top-hat) - Benign fibroadipose connective tissue with microscopic foci of remote fat necrosis. - Unremarkable fragments of hyaline cartilage. (concordant - as per verbal report by radiology)  2024 right US -->birads4  9:00 position 8 to 9 cm from the scar, corresponding to the area palpable concern, there is a circumscribed mass measuring 0.6 x 0.7 x 0.3 cm. Ultrasound-guided biopsy biopsied mass at the 1:00 position 3 cm from the scar is stable and measures 0.7 x 0.6 x 0.3 cm  2024 BREAST, RIGHT MASS, ULTRASOUND-GUIDED NEEDLE CORE BIOPSIES:(stop-light) - BENIGN DERMAL SOFT TISSUE FRAGMENT WITH REMOTE POSTPROCEDURAL SITE CHANGES INCLUDING CHRONIC LYMPHOHISTIOCYTIC CELL INFLAMMATION, A MULTINUCLEATED FOREIGN BODY TYPE GIANT CELL REACTION, AND REMOTE HEMORRHAGE. Findings are benign concordant.  She underwent bilateral revision of breast reconstruction in 2024: 1.  Breast, left mastectomy flap, excision/reconstruction: - Benign skin with dermal fibrosis and focal remote postprocedural site changes. 2.  Breast, right mastectomy flap, excision/reconstruction: - Benign skin with dermal fibrosis and focal remote postprocedural site changes.  10/2/24: right breast reconstruction revision with direct excision (lift) Breast, right tissue and skin, PAULO flap reconstruction: - Benign fibrofatty breast tissue, skin, and subcutaneous adipose tissue with focal remote postsurgical site changes including suture granulomata.  She notes an area of concern in the left axilla. States it is tender. No associated symptoms.  2025 left US -->birads1 No sonographic evidence of malignancy  We discussed that her US was benign.  We discussed a possible MRI. The use of MRIs have not been shown to prolong survival, however out of 1000 women screened, an additional 14-15 cancers will be identified. The use of MRIs, has, however, been shown to increase the number of procedures and additional imaging because although it is a very sensitive test, it is not as specific. I also discussed that some patients could have an allergic reaction to gadolinium, or affect the kidneys, and gadolinium has been found to be deposited in the brain of patients who undergo many MRIs in their lifetime, although the effects are currently understudy. She understands and wishes to defer at this time s her symptoms resolved with OT.  She last saw medical oncology in 3/2025 with a 6 month follow up recommended and she saw plastic surgery in 2025.  Bio-impedance testing performed.  All questions and concerns were answered in detail.  Patient is for 6 month follow up for CBE. Sozo performed.  Total time spent on encounter was greater than 30 minutes , which included face to face time with the patient, performing an exam, reviewing previous medical records, reviewing current imaging/ pathology, documenting in patient record and coordinating care/imaging. Greater than 50% of the encounter was spent on counseling and coordination of her breast issue.

## 2025-07-17 NOTE — DATA REVIEWED
[FreeTextEntry1] : ACC: [id number]     EXAM:  MG US BREAST COMPLETE LT   ORDERED BY: ALBERT CABALLERO  PROCEDURE DATE:  02/19/2025    INTERPRETATION:  CLINICAL HISTORY: Left axillary palpable concern and diffuse left-sided pain. Post bilateral mastectomy Technique: Whole left breast ultrasound Comparison: None available  Findings:  No suspicious mass or cyst is identified in the region of the palpable concern in the left axilla or reconstructed left breast  Impression:  No sonographic evidence of malignancy  Recommendation: Any decision to biopsy the palpable abnormality should be based on the level of clinical concern.  BI-RADS Category 1: Negative

## 2025-07-17 NOTE — PHYSICAL EXAM
[Normocephalic] : normocephalic [EOMI] : extra ocular movement intact [No Supraclavicular Adenopathy] : no supraclavicular adenopathy [No Cervical Adenopathy] : no cervical adenopathy [Examined in the supine and seated position] : examined in the supine and seated position [No dominant masses] : no dominant masses in right breast  [No dominant masses] : no dominant masses left breast [No Axillary Lymphadenopathy] : no left axillary lymphadenopathy [No Rashes] : no rashes [No Ulceration] : no ulceration [de-identified] : nL respirations [de-identified] : s/p bilateral mastectomies [de-identified] : s/p bilateral mastectomies [de-identified] : L-Dex Score - Left Arm = 4.3

## 2025-07-17 NOTE — PHYSICAL EXAM
[Normocephalic] : normocephalic [EOMI] : extra ocular movement intact [No Supraclavicular Adenopathy] : no supraclavicular adenopathy [No Cervical Adenopathy] : no cervical adenopathy [Examined in the supine and seated position] : examined in the supine and seated position [No dominant masses] : no dominant masses in right breast  [No dominant masses] : no dominant masses left breast [No Axillary Lymphadenopathy] : no left axillary lymphadenopathy [No Rashes] : no rashes [No Ulceration] : no ulceration [de-identified] : nL respirations [de-identified] : s/p bilateral mastectomies [de-identified] : s/p bilateral mastectomies [de-identified] : L-Dex Score - Left Arm = 4.3

## 2025-07-17 NOTE — HISTORY OF PRESENT ILLNESS
[FreeTextEntry1] : Lilly is a 54 F with a L breast IDC (+/+/-) s/p b/l skin sparing mastectomy, right sentinel lymph node biopsy, left axillary lymph node dissection with bilateral msTRAM flap reconstruction on 3/24/2021: Left 3cm IDC, 1/21 nodes; R benign 0/1 node. [id number]  S/p adjuvant TCHP. s/p L PMRT. On letrozole.  S/P prophylactic BSO and hysterectomy 3/2022.  She underwent bilateral revision of breast reconstruction in 2024.  HISTORICAL RISK FACTORS:  -family history of breast cancer in two maternal aunts at ages 46 and 47  -, age at first live birth was 20  -prior OCP use x 3 years, stopped in  -no gyn surgeries   She had the following work up:  12/10/2020 -- b/l dx mammogram and US  -scattered areas of fibroglandular densities -no suspicious mass, microcalcifications or architectural distortion in R  -LEFT: @6:00, retroareolar, correlating with patient's palpable concerns, 1.5 cm spiculated mass  b/l US  -RIGHT: no suspicious abnormalities  -LEFT: @6:00, retroareolar, hypoechoic irregular mass, measuring 1 x 0.7 x 0.8 cm --> BIOPSY;  @5:00, retroareolar, hypoechoic irregular mass, measuring 8 x 8 x 7 mm --> BIOPSY  -no suspicious lymphadenopathy  BIRADS 5   2021 -- L US CNBx x 2  1. @5:00, retroareolar (hourglass) -IDC, moderately to poorly differentiated  -focal DCIS, intermediate to high nuclear grade  -ER/[place] pos (Alyse 8/4), Her 2 pos on IHC, Ki67:30%  2. @6:00, retroareolar (cork clip)  -IDC, moderately to poorly differentiated  -focal DCIS, intermediate to high nuclear grade  -ER/[place] pos (Alyse 8/4), Her 2 pos on IHC, Ki67:30%  3/24/2021 -- b/l SSM and SLN Bx, L ALND with immediate PAULO flap reconstruction  1. L breast -IDC, poorly differentiated -T= 3 cm  -DCIS, high nuclear grade  -no LVI  -1/2 SLN positive for metastatic disease  -19 benign left axillary lymph nodes   2. R breast  -benign fibrofatty breast tissue  -1 negative sentinel lymph node   S/p adjuvant TCHP. s/p L PMRT. On letrozole. S/P prophylactic BSO and hysterectomy 3/2022.  23: R US --> BIRADS 4 Targeted unilateral right chest wall ultrasound was performed. At 1:00 N3 area of palpable concern in the right chest wall there is a round heterogeneous mass measuring 0.6 x 0.8 x 0.4 cm. Ultrasound-guided biopsy recommended.  2023: USGB, R Chest wall, right mass, ultrasound-guided needle core biopsies:(top-hat) - Benign fibroadipose connective tissue with microscopic foci of remote fat necrosis. - Unremarkable fragments of hyaline cartilage. (concordant - as per verbal report by radiology)  2024 right US -->[email or web address]  9:00 position 8 to 9 cm from the scar, corresponding to the area palpable concern, there is a circumscribed mass measuring 0.6 x 0.7 x 0.3 cm. Ultrasound-guided biopsy biopsied mass at the 1:00 position 3 cm from the scar is stable and measures 0.7 x 0.6 x 0.3 cm  2024 BREAST, RIGHT MASS, ULTRASOUND-GUIDED NEEDLE CORE BIOPSIES:(stop-light) - BENIGN DERMAL SOFT TISSUE FRAGMENT WITH REMOTE POSTPROCEDURAL SITE CHANGES INCLUDING CHRONIC LYMPHOHISTIOCYTIC CELL INFLAMMATION, A MULTINUCLEATED FOREIGN BODY TYPE GIANT CELL REACTION, AND REMOTE HEMORRHAGE. Findings are benign concordant.  She underwent bilateral revision of breast reconstruction in 2024: 1.  Breast, left mastectomy flap, excision/reconstruction: - Benign skin with dermal fibrosis and focal remote postprocedural site changes. 2.  Breast, right mastectomy flap, excision/reconstruction: - Benign skin with dermal fibrosis and focal remote postprocedural site changes.  10/2/24: right breast reconstruction revision with direct excision (lift) Breast, right tissue and skin, PAULO flap reconstruction: - Benign fibrofatty breast tissue, skin, and subcutaneous adipose tissue with focal remote postsurgical site changes including suture granulomata.  She notes an area of concern in the left axilla. States it is tender. No associated symptoms.  2025 left US -->birads1 No sonographic evidence of malignancy  No acute complaints at this time. She notes that the pain in her left axilla has resolved with OT.

## 2025-07-17 NOTE — HISTORY OF PRESENT ILLNESS
[FreeTextEntry1] : Lilly is a 54 F with a L breast IDC (+/+/-) s/p b/l skin sparing mastectomy, right sentinel lymph node biopsy, left axillary lymph node dissection with bilateral msTRAM flap reconstruction on 3/24/2021: Left 3cm IDC, 1/21 nodes; R benign 0/1 node. [id number]  S/p adjuvant TCHP. s/p L PMRT. On letrozole.  S/P prophylactic BSO and hysterectomy 3/2022.  She underwent bilateral revision of breast reconstruction in 2024.  HISTORICAL RISK FACTORS:  -family history of breast cancer in two maternal aunts at ages 46 and 47  -, age at first live birth was 20  -prior OCP use x 3 years, stopped in  -no gyn surgeries   She had the following work up:  12/10/2020 -- b/l dx mammogram and US  -scattered areas of fibroglandular densities -no suspicious mass, microcalcifications or architectural distortion in R  -LEFT: @6:00, retroareolar, correlating with patient's palpable concerns, 1.5 cm spiculated mass  b/l US  -RIGHT: no suspicious abnormalities  -LEFT: @6:00, retroareolar, hypoechoic irregular mass, measuring 1 x 0.7 x 0.8 cm --> BIOPSY;  @5:00, retroareolar, hypoechoic irregular mass, measuring 8 x 8 x 7 mm --> BIOPSY  -no suspicious lymphadenopathy  BIRADS 5   2021 -- L US CNBx x 2  1. @5:00, retroareolar (hourglass) -IDC, moderately to poorly differentiated  -focal DCIS, intermediate to high nuclear grade  -ER/[place] pos (Alyse 8/4), Her 2 pos on IHC, Ki67:30%  2. @6:00, retroareolar (cork clip)  -IDC, moderately to poorly differentiated  -focal DCIS, intermediate to high nuclear grade  -ER/[place] pos (Alyse 8/4), Her 2 pos on IHC, Ki67:30%  3/24/2021 -- b/l SSM and SLN Bx, L ALND with immediate PAULO flap reconstruction  1. L breast -IDC, poorly differentiated -T= 3 cm  -DCIS, high nuclear grade  -no LVI  -1/2 SLN positive for metastatic disease  -19 benign left axillary lymph nodes   2. R breast  -benign fibrofatty breast tissue  -1 negative sentinel lymph node   S/p adjuvant TCHP. s/p L PMRT. On letrozole. S/P prophylactic BSO and hysterectomy 3/2022.  23: R US --> BIRADS 4 Targeted unilateral right chest wall ultrasound was performed. At 1:00 N3 area of palpable concern in the right chest wall there is a round heterogeneous mass measuring 0.6 x 0.8 x 0.4 cm. Ultrasound-guided biopsy recommended.  2023: USGB, R Chest wall, right mass, ultrasound-guided needle core biopsies:(top-hat) - Benign fibroadipose connective tissue with microscopic foci of remote fat necrosis. - Unremarkable fragments of hyaline cartilage. (concordant - as per verbal report by radiology)  2024 right US -->[email or web address]  9:00 position 8 to 9 cm from the scar, corresponding to the area palpable concern, there is a circumscribed mass measuring 0.6 x 0.7 x 0.3 cm. Ultrasound-guided biopsy biopsied mass at the 1:00 position 3 cm from the scar is stable and measures 0.7 x 0.6 x 0.3 cm  2024 BREAST, RIGHT MASS, ULTRASOUND-GUIDED NEEDLE CORE BIOPSIES:(stop-light) - BENIGN DERMAL SOFT TISSUE FRAGMENT WITH REMOTE POSTPROCEDURAL SITE CHANGES INCLUDING CHRONIC LYMPHOHISTIOCYTIC CELL INFLAMMATION, A MULTINUCLEATED FOREIGN BODY TYPE GIANT CELL REACTION, AND REMOTE HEMORRHAGE. Findings are benign concordant.  She underwent bilateral revision of breast reconstruction in 2024: 1.  Breast, left mastectomy flap, excision/reconstruction: - Benign skin with dermal fibrosis and focal remote postprocedural site changes. 2.  Breast, right mastectomy flap, excision/reconstruction: - Benign skin with dermal fibrosis and focal remote postprocedural site changes.  10/2/24: right breast reconstruction revision with direct excision (lift) Breast, right tissue and skin, PAULO flap reconstruction: - Benign fibrofatty breast tissue, skin, and subcutaneous adipose tissue with focal remote postsurgical site changes including suture granulomata.  She notes an area of concern in the left axilla. States it is tender. No associated symptoms.  2025 left US -->birads1 No sonographic evidence of malignancy  No acute complaints at this time. She notes that the pain in her left axilla has resolved with OT.

## 2025-07-17 NOTE — ASSESSMENT
[FreeTextEntry1] : Lilly is a 54 F with a L breast IDC (+/+/-) s/p b/l skin sparing mastectomy, right sentinel lymph node biopsy, left axillary lymph node dissection with bilateral msTRAM flap reconstruction on 3/24/2021: Left 3cm IDC, 1/21 nodes; R benign 0/1 node. [id number]  S/p adjuvant TCHP. s/p L PMRT. On letrozole.  S/P prophylactic BSO and hysterectomy 3/2022.  She underwent bilateral revision of breast reconstruction in 2024.  HISTORICAL RISK FACTORS:  -family history of breast cancer in two maternal aunts at ages 46 and 47  -, age at first live birth was 20  -prior OCP use x 3 years, stopped in  -no gyn surgeries   She had the following work up:  12/10/2020 -- b/l dx mammogram and US  -scattered areas of fibroglandular densities -no suspicious mass, microcalcifications or architectural distortion in R  -LEFT: @6:00, retroareolar, correlating with patient's palpable concerns, 1.5 cm spiculated mass  b/l US  -RIGHT: no suspicious abnormalities  -LEFT: @6:00, retroareolar, hypoechoic irregular mass, measuring 1 x 0.7 x 0.8 cm --> BIOPSY;  @5:00, retroareolar, hypoechoic irregular mass, measuring 8 x 8 x 7 mm --> BIOPSY  -no suspicious lymphadenopathy  BIRADS 5   2021 -- L US CNBx x 2  1. @5:00, retroareolar (hourglass) -IDC, moderately to poorly differentiated  -focal DCIS, intermediate to high nuclear grade  -ER/SC pos (Alyse 8/4), Her 2 pos on IHC, Ki67:30%  2. @6:00, retroareolar (cork clip)  -IDC, moderately to poorly differentiated  -focal DCIS, intermediate to high nuclear grade  -ER/SC pos (Alyse 8/4), Her 2 pos on IHC, Ki67:30%  3/24/2021 -- b/l SSM and SLN Bx, L ALND with immediate PAULO flap reconstruction  1. L breast -IDC, poorly differentiated -T= 3 cm  -DCIS, high nuclear grade  -no LVI  -1/2 SLN positive for metastatic disease  -19 benign left axillary lymph nodes   2. R breast  -benign fibrofatty breast tissue  -1 negative sentinel lymph node   S/p adjuvant TCHP. s/p L PMRT. On letrozole. S/P prophylactic BSO and hysterectomy 3/2022.  23: R US --> BIRADS 4 Targeted unilateral right chest wall ultrasound was performed. At 1:00 N3 area of palpable concern in the right chest wall there is a round heterogeneous mass measuring 0.6 x 0.8 x 0.4 cm. Ultrasound-guided biopsy recommended.  2023: USGB, R Chest wall, right mass, ultrasound-guided needle core biopsies:(top-hat) - Benign fibroadipose connective tissue with microscopic foci of remote fat necrosis. - Unremarkable fragments of hyaline cartilage. (concordant - as per verbal report by radiology)  2024 right US -->[id number]  9:00 position 8 to 9 cm from the scar, corresponding to the area palpable concern, there is a circumscribed mass measuring 0.6 x 0.7 x 0.3 cm. Ultrasound-guided biopsy biopsied mass at the 1:00 position 3 cm from the scar is stable and measures 0.7 x 0.6 x 0.3 cm  2024 BREAST, RIGHT MASS, ULTRASOUND-GUIDED NEEDLE CORE BIOPSIES:(stop-light) - BENIGN DERMAL SOFT TISSUE FRAGMENT WITH REMOTE POSTPROCEDURAL SITE CHANGES INCLUDING CHRONIC LYMPHOHISTIOCYTIC CELL INFLAMMATION, A MULTINUCLEATED FOREIGN BODY TYPE GIANT CELL REACTION, AND REMOTE HEMORRHAGE. Findings are benign concordant.  She underwent bilateral revision of breast reconstruction in 2024: 1.  Breast, left mastectomy flap, excision/reconstruction: - Benign skin with dermal fibrosis and focal remote postprocedural site changes. 2.  Breast, right mastectomy flap, excision/reconstruction: - Benign skin with dermal fibrosis and focal remote postprocedural site changes.  10/2/24: right breast reconstruction revision with direct excision (lift) Breast, right tissue and skin, PAULO flap reconstruction: - Benign fibrofatty breast tissue, skin, and subcutaneous adipose tissue with focal remote postsurgical site changes including suture granulomata.  She notes an area of concern in the left axilla. States it is tender. No associated symptoms.  2025 left US -->birads1 No sonographic evidence of malignancy  We discussed that her US was benign.  We discussed a possible MRI. The use of MRIs have not been shown to prolong survival, however out of 1000 women screened, an additional 14-15 cancers will be identified. The use of MRIs, has, however, been shown to increase the number of procedures and additional imaging because although it is a very sensitive test, it is not as specific. I also discussed that some patients could have an allergic reaction to gadolinium, or affect the kidneys, and gadolinium has been found to be deposited in the brain of patients who undergo many MRIs in their lifetime, although the effects are currently understudy. She understands and wishes to defer at this time s her symptoms resolved with OT.  She last saw medical oncology in 3/2025 with a 6 month follow up recommended and she saw plastic surgery in 2025.  Bio-impedance testing performed.  All questions and concerns were answered in detail.  Patient is for 6 month follow up for CBE. Sozo performed.  Total time spent on encounter was greater than 30 minutes , which included face to face time with the patient, performing an exam, reviewing previous medical records, reviewing current imaging/ pathology, documenting in patient record and coordinating care/imaging. Greater than 50% of the encounter was spent on counseling and coordination of her breast issue.